# Patient Record
Sex: MALE | Race: OTHER | NOT HISPANIC OR LATINO | URBAN - METROPOLITAN AREA
[De-identification: names, ages, dates, MRNs, and addresses within clinical notes are randomized per-mention and may not be internally consistent; named-entity substitution may affect disease eponyms.]

---

## 2017-07-04 ENCOUNTER — EMERGENCY (EMERGENCY)
Facility: HOSPITAL | Age: 57
LOS: 0 days | Discharge: HOME | End: 2017-07-04

## 2017-07-04 DIAGNOSIS — M25.511 PAIN IN RIGHT SHOULDER: ICD-10-CM

## 2017-07-04 DIAGNOSIS — I10 ESSENTIAL (PRIMARY) HYPERTENSION: ICD-10-CM

## 2017-07-04 DIAGNOSIS — Z88.1 ALLERGY STATUS TO OTHER ANTIBIOTIC AGENTS STATUS: ICD-10-CM

## 2017-07-04 DIAGNOSIS — Z79.2 LONG TERM (CURRENT) USE OF ANTIBIOTICS: ICD-10-CM

## 2019-05-31 PROBLEM — Z00.00 ENCOUNTER FOR PREVENTIVE HEALTH EXAMINATION: Status: ACTIVE | Noted: 2019-05-31

## 2019-06-27 ENCOUNTER — RECORD ABSTRACTING (OUTPATIENT)
Age: 59
End: 2019-06-27

## 2019-06-27 DIAGNOSIS — Z82.49 FAMILY HISTORY OF ISCHEMIC HEART DISEASE AND OTHER DISEASES OF THE CIRCULATORY SYSTEM: ICD-10-CM

## 2019-06-27 DIAGNOSIS — Z92.89 PERSONAL HISTORY OF OTHER MEDICAL TREATMENT: ICD-10-CM

## 2019-06-27 RX ORDER — ASPIRIN 81 MG
81 TABLET, DELAYED RELEASE (ENTERIC COATED) ORAL DAILY
Refills: 0 | Status: ACTIVE | COMMUNITY

## 2019-06-27 RX ORDER — NITROGLYCERIN 0.4 MG/1
0.4 TABLET SUBLINGUAL
Refills: 0 | Status: ACTIVE | COMMUNITY

## 2019-08-26 VITALS — HEIGHT: 70 IN

## 2019-08-27 ENCOUNTER — TRANSCRIPTION ENCOUNTER (OUTPATIENT)
Age: 59
End: 2019-08-27

## 2019-08-27 ENCOUNTER — APPOINTMENT (OUTPATIENT)
Dept: CARDIOLOGY | Facility: CLINIC | Age: 59
End: 2019-08-27
Payer: MEDICAID

## 2019-08-27 VITALS
HEART RATE: 69 BPM | HEIGHT: 70 IN | SYSTOLIC BLOOD PRESSURE: 120 MMHG | BODY MASS INDEX: 30.21 KG/M2 | DIASTOLIC BLOOD PRESSURE: 72 MMHG | WEIGHT: 211 LBS

## 2019-08-27 DIAGNOSIS — E78.5 HYPERLIPIDEMIA, UNSPECIFIED: ICD-10-CM

## 2019-08-27 DIAGNOSIS — I25.2 OLD MYOCARDIAL INFARCTION: ICD-10-CM

## 2019-08-27 PROCEDURE — 93000 ELECTROCARDIOGRAM COMPLETE: CPT

## 2019-08-27 PROCEDURE — 99213 OFFICE O/P EST LOW 20 MIN: CPT

## 2019-08-27 RX ORDER — PNV NO.95/FERROUS FUM/FOLIC AC 28MG-0.8MG
TABLET ORAL
Refills: 0 | Status: ACTIVE | COMMUNITY

## 2019-08-27 NOTE — PHYSICAL EXAM
[General Appearance - Well Developed] : well developed [Normal Appearance] : normal appearance [Well Groomed] : well groomed [General Appearance - Well Nourished] : well nourished [No Deformities] : no deformities [Normal Oral Mucosa] : normal oral mucosa [General Appearance - In No Acute Distress] : no acute distress [No Oral Cyanosis] : no oral cyanosis [No Oral Pallor] : no oral pallor [Normal Jugular Venous A Waves Present] : normal jugular venous A waves present [No Jugular Venous Davis A Waves] : no jugular venous davis A waves [Normal Jugular Venous V Waves Present] : normal jugular venous V waves present [Respiration, Rhythm And Depth] : normal respiratory rhythm and effort [Auscultation Breath Sounds / Voice Sounds] : lungs were clear to auscultation bilaterally [Exaggerated Use Of Accessory Muscles For Inspiration] : no accessory muscle use [Heart Sounds] : normal S1 and S2 [Heart Rate And Rhythm] : heart rate and rhythm were normal [Murmurs] : no murmurs present [Abdomen Soft] : soft [Abdomen Tenderness] : non-tender [Abdomen Mass (___ Cm)] : no abdominal mass palpated [Nail Clubbing] : no clubbing of the fingernails [Petechial Hemorrhages (___cm)] : no petechial hemorrhages [Cyanosis, Localized] : no localized cyanosis [] : no ischemic changes [Skin Color & Pigmentation] : normal skin color and pigmentation [Oriented To Time, Place, And Person] : oriented to person, place, and time

## 2019-08-27 NOTE — HISTORY OF PRESENT ILLNESS
[FreeTextEntry1] : 57 yo male with pmhx as below is here for af/up visit\par S/p IPMI 1998, successfully trombolized, cath at [place] showed mod 2v cad, has been medically mx since then\par Last stress MPI 2/14 - low risk, stress echo 4/17 and 1/19 - no ischemia\par no major events, lost few lbs, started exercising again, no c/o cp, sob, nagel, pnd, orthopnea, no peripheral edema. No palpitations or exertional symptoms, no syncope or dizziness.\par et is stable, bp has been stable

## 2019-08-27 NOTE — ASSESSMENT
[FreeTextEntry1] : # Coronary arteriosclerosis in native artery  (I25.10):\par # Mixed hypercholesterolemia and hypertriglyceridemia  (E78.2):\par # Overweight  (E66.9):\par \par 59 yo male with pmhx and presentation as above\par cad\par  stable BP/dyslipidemia\par Arthritis\par \par Cont present care\par Repeat labs\par Cont with weight reduction, daily exercises and lowering caloric intake\par Avoid nsaids\par Cont with aggressive risk modif\par Diet and act as above\par RTC 4 months

## 2019-12-10 ENCOUNTER — APPOINTMENT (OUTPATIENT)
Dept: CARDIOLOGY | Facility: CLINIC | Age: 59
End: 2019-12-10
Payer: MEDICAID

## 2019-12-10 ENCOUNTER — RESULT CHARGE (OUTPATIENT)
Age: 59
End: 2019-12-10

## 2019-12-10 VITALS
BODY MASS INDEX: 29.63 KG/M2 | SYSTOLIC BLOOD PRESSURE: 118 MMHG | HEART RATE: 64 BPM | HEIGHT: 70 IN | WEIGHT: 207 LBS | DIASTOLIC BLOOD PRESSURE: 76 MMHG

## 2019-12-10 DIAGNOSIS — M19.90 UNSPECIFIED OSTEOARTHRITIS, UNSPECIFIED SITE: ICD-10-CM

## 2019-12-10 PROCEDURE — 93000 ELECTROCARDIOGRAM COMPLETE: CPT

## 2019-12-10 PROCEDURE — 99214 OFFICE O/P EST MOD 30 MIN: CPT

## 2019-12-10 RX ORDER — HYDROXYZINE HYDROCHLORIDE 25 MG/1
25 TABLET ORAL
Refills: 0 | Status: ACTIVE | COMMUNITY

## 2019-12-10 RX ORDER — DICLOFENAC SODIUM 50 MG/1
50 TABLET, DELAYED RELEASE ORAL
Refills: 0 | Status: ACTIVE | COMMUNITY

## 2019-12-10 NOTE — PHYSICAL EXAM
[General Appearance - Well Developed] : well developed [Normal Appearance] : normal appearance [Well Groomed] : well groomed [General Appearance - Well Nourished] : well nourished [No Deformities] : no deformities [General Appearance - In No Acute Distress] : no acute distress [Normal Oral Mucosa] : normal oral mucosa [No Oral Pallor] : no oral pallor [No Oral Cyanosis] : no oral cyanosis [Normal Jugular Venous V Waves Present] : normal jugular venous V waves present [Normal Jugular Venous A Waves Present] : normal jugular venous A waves present [No Jugular Venous Davis A Waves] : no jugular venous davis A waves [Respiration, Rhythm And Depth] : normal respiratory rhythm and effort [Exaggerated Use Of Accessory Muscles For Inspiration] : no accessory muscle use [Auscultation Breath Sounds / Voice Sounds] : lungs were clear to auscultation bilaterally [Heart Rate And Rhythm] : heart rate and rhythm were normal [Heart Sounds] : normal S1 and S2 [Murmurs] : no murmurs present [Abdomen Soft] : soft [Abdomen Tenderness] : non-tender [Abdomen Mass (___ Cm)] : no abdominal mass palpated [Nail Clubbing] : no clubbing of the fingernails [Cyanosis, Localized] : no localized cyanosis [Petechial Hemorrhages (___cm)] : no petechial hemorrhages [] : no ischemic changes [Skin Color & Pigmentation] : normal skin color and pigmentation [Oriented To Time, Place, And Person] : oriented to person, place, and time

## 2019-12-10 NOTE — HISTORY OF PRESENT ILLNESS
[FreeTextEntry1] : 57 yo male with pmhx as below is here for af/up visit\par S/p IPMI 1998, successfully trombolized, cath at [place] showed mod 2v cad, has been medically mx since then\par Last stress MPI 2/14 - low risk, stress echo 4/17 and 1/19 - no ischemia\par no major events, lost few lbs, started exercising again, no c/o cp, sob, nagel, pnd, orthopnea, no peripheral edema. No palpitations or exertional symptoms, no syncope or dizziness.\par et is stable, bp has been stable

## 2019-12-10 NOTE — PHYSICAL EXAM
[General Appearance - Well Developed] : well developed [Normal Appearance] : normal appearance [Well Groomed] : well groomed [General Appearance - Well Nourished] : well nourished [No Deformities] : no deformities [General Appearance - In No Acute Distress] : no acute distress [Normal Oral Mucosa] : normal oral mucosa [No Oral Pallor] : no oral pallor [No Oral Cyanosis] : no oral cyanosis [Normal Jugular Venous A Waves Present] : normal jugular venous A waves present [Normal Jugular Venous V Waves Present] : normal jugular venous V waves present [No Jugular Venous Davis A Waves] : no jugular venous davis A waves [Respiration, Rhythm And Depth] : normal respiratory rhythm and effort [Exaggerated Use Of Accessory Muscles For Inspiration] : no accessory muscle use [Auscultation Breath Sounds / Voice Sounds] : lungs were clear to auscultation bilaterally [Heart Rate And Rhythm] : heart rate and rhythm were normal [Heart Sounds] : normal S1 and S2 [Murmurs] : no murmurs present [Abdomen Soft] : soft [Abdomen Tenderness] : non-tender [Abdomen Mass (___ Cm)] : no abdominal mass palpated [Nail Clubbing] : no clubbing of the fingernails [Cyanosis, Localized] : no localized cyanosis [Petechial Hemorrhages (___cm)] : no petechial hemorrhages [] : no ischemic changes [Skin Color & Pigmentation] : normal skin color and pigmentation [Oriented To Time, Place, And Person] : oriented to person, place, and time

## 2019-12-10 NOTE — ASSESSMENT
[FreeTextEntry1] : # Coronary arteriosclerosis in native artery  (I25.10):\par # Mixed hypercholesterolemia and hypertriglyceridemia  (E78.2):\par # Overweight  (E66.9):\par \par 57 yo male with pmhx and presentation as above\par cad\par  stable BP/dyslipidemia\par Arthritis\par \par Cont present care\par Repeat labs\par Cont with weight reduction, daily exercises and lowering caloric intake\par Avoid nsaids\par Cont with aggressive risk modif\par Diet and act as above\par RTC 4 months

## 2019-12-10 NOTE — HISTORY OF PRESENT ILLNESS
[FreeTextEntry1] : 59 yo male with pmhx as below is here for af/up visit\par S/p IPMI 1998, successfully trombolized, cath at [place] showed mod 2v cad, has been medically mx since then\par Last stress MPI 2/14 - low risk, stress echo 4/17 and 1/19 - no ischemia\par no major events, lost few lbs, started exercising again, no c/o cp, sob, nagel, pnd, orthopnea, no peripheral edema. No palpitations or exertional symptoms, no syncope or dizziness.\par et is stable, bp has been stable

## 2020-01-28 ENCOUNTER — APPOINTMENT (OUTPATIENT)
Dept: CARDIOLOGY | Facility: CLINIC | Age: 60
End: 2020-01-28
Payer: MEDICAID

## 2020-01-28 VITALS
BODY MASS INDEX: 30.28 KG/M2 | DIASTOLIC BLOOD PRESSURE: 78 MMHG | SYSTOLIC BLOOD PRESSURE: 118 MMHG | HEART RATE: 63 BPM | WEIGHT: 211 LBS

## 2020-01-28 PROCEDURE — 99214 OFFICE O/P EST MOD 30 MIN: CPT

## 2020-01-28 PROCEDURE — 93000 ELECTROCARDIOGRAM COMPLETE: CPT

## 2020-01-28 NOTE — HISTORY OF PRESENT ILLNESS
[FreeTextEntry1] : 60 yo male with pmhx as below is here for af/up visit\par S/p IPMI 1998, successfully trombolized, cath at [place] showed mod 2v cad, has been medically mx since then\par Last stress MPI 2/14 - low risk, stress echo 4/17 and 1/19 - no ischemia\par + cp and epigastric discomfort last few days, + Stress\par gained few lbs, stopped exercise due to arthritis, no c/o sob, nagel, pnd, orthopnea, no peripheral edema. No palpitations or exertional symptoms, no syncope or dizziness.\par et is stable, bp has been stable

## 2020-01-28 NOTE — PHYSICAL EXAM
[General Appearance - Well Developed] : well developed [Normal Appearance] : normal appearance [Well Groomed] : well groomed [General Appearance - Well Nourished] : well nourished [No Deformities] : no deformities [Normal Oral Mucosa] : normal oral mucosa [General Appearance - In No Acute Distress] : no acute distress [No Oral Pallor] : no oral pallor [No Oral Cyanosis] : no oral cyanosis [Normal Jugular Venous A Waves Present] : normal jugular venous A waves present [No Jugular Venous Davis A Waves] : no jugular venous davis A waves [Normal Jugular Venous V Waves Present] : normal jugular venous V waves present [Heart Rate And Rhythm] : heart rate and rhythm were normal [Heart Sounds] : normal S1 and S2 [Murmurs] : no murmurs present [Respiration, Rhythm And Depth] : normal respiratory rhythm and effort [Auscultation Breath Sounds / Voice Sounds] : lungs were clear to auscultation bilaterally [Exaggerated Use Of Accessory Muscles For Inspiration] : no accessory muscle use [Abdomen Soft] : soft [Abdomen Tenderness] : non-tender [Abdomen Mass (___ Cm)] : no abdominal mass palpated [Nail Clubbing] : no clubbing of the fingernails [] : no ischemic changes [Cyanosis, Localized] : no localized cyanosis [Petechial Hemorrhages (___cm)] : no petechial hemorrhages [Oriented To Time, Place, And Person] : oriented to person, place, and time [Skin Color & Pigmentation] : normal skin color and pigmentation

## 2020-01-28 NOTE — ASSESSMENT
[FreeTextEntry1] : # Coronary arteriosclerosis in native artery  (I25.10):\par # Mixed hypercholesterolemia and hypertriglyceridemia  (E78.2):\par # Overweight  (E66.9):\par \par 58 yo male with pmhx and presentation as above\par cad/cp\par stable BP/dyslipidemia\par Arthritis\par \par Cont present care\par monitor symptoms, cp rather atypical\par ?GI eval\par Cont with weight reduction, daily exercises and lowering caloric intake\par Avoid nsaids on regular bases - may take prn\par Cont with aggressive risk modif\par Diet and act as above\par RTC 6-8 weeks

## 2020-07-21 ENCOUNTER — RESULT CHARGE (OUTPATIENT)
Age: 60
End: 2020-07-21

## 2020-07-21 ENCOUNTER — APPOINTMENT (OUTPATIENT)
Dept: CARDIOLOGY | Facility: CLINIC | Age: 60
End: 2020-07-21
Payer: MEDICAID

## 2020-07-21 VITALS — TEMPERATURE: 97.4 F | WEIGHT: 208 LBS | BODY MASS INDEX: 29.85 KG/M2

## 2020-07-21 VITALS — SYSTOLIC BLOOD PRESSURE: 104 MMHG | HEART RATE: 57 BPM | DIASTOLIC BLOOD PRESSURE: 60 MMHG

## 2020-07-21 PROCEDURE — 99214 OFFICE O/P EST MOD 30 MIN: CPT

## 2020-07-21 PROCEDURE — 93000 ELECTROCARDIOGRAM COMPLETE: CPT

## 2020-07-21 NOTE — PHYSICAL EXAM
[General Appearance - Well Developed] : well developed [Normal Appearance] : normal appearance [Well Groomed] : well groomed [General Appearance - Well Nourished] : well nourished [No Deformities] : no deformities [General Appearance - In No Acute Distress] : no acute distress [Normal Oral Mucosa] : normal oral mucosa [No Oral Pallor] : no oral pallor [No Oral Cyanosis] : no oral cyanosis [Normal Jugular Venous A Waves Present] : normal jugular venous A waves present [No Jugular Venous Davis A Waves] : no jugular venous davis A waves [Normal Jugular Venous V Waves Present] : normal jugular venous V waves present [Exaggerated Use Of Accessory Muscles For Inspiration] : no accessory muscle use [Respiration, Rhythm And Depth] : normal respiratory rhythm and effort [Auscultation Breath Sounds / Voice Sounds] : lungs were clear to auscultation bilaterally [Heart Rate And Rhythm] : heart rate and rhythm were normal [Murmurs] : no murmurs present [Heart Sounds] : normal S1 and S2 [Abdomen Soft] : soft [Abdomen Tenderness] : non-tender [Abdomen Mass (___ Cm)] : no abdominal mass palpated [Nail Clubbing] : no clubbing of the fingernails [Cyanosis, Localized] : no localized cyanosis [Petechial Hemorrhages (___cm)] : no petechial hemorrhages [] : no ischemic changes [Oriented To Time, Place, And Person] : oriented to person, place, and time [Skin Color & Pigmentation] : normal skin color and pigmentation

## 2020-07-21 NOTE — HISTORY OF PRESENT ILLNESS
[FreeTextEntry1] : 58 yo male with pmhx as below is here for af/up visit\par S/p IPMI 1998, successfully trombolized, cath at [place] showed mod 2v cad, has been medically mx since then\par Last stress MPI 2/14 - low risk, stress echo 4/17 and 1/19 - no ischemia\par LOST Few lbs, started exercising 3-4 times weekly,  no c/o sob, nagel, pnd, orthopnea, no peripheral edema. No palpitations or exertional symptoms, no syncope or dizziness.\par et is stable, bp has been labile

## 2020-07-21 NOTE — ASSESSMENT
[FreeTextEntry1] : # Coronary arteriosclerosis in native artery  (I25.10):\par # Mixed hypercholesterolemia and hypertriglyceridemia  (E78.2):\par # Overweight  (E66.9):\par \par 58 yo male with pmhx and presentation as above\par cad/cp\par stable BP/dyslipidemia\par Arthritis\par \par Cont present care\par cut losartan in half\par Cont with weight reduction, daily exercises and lowering caloric intake\par Avoid nsaids on regular bases - may take prn\par Cont with aggressive risk modif\par Diet and act as above\par RTC 4 months, labs 1 week prior

## 2020-10-19 RX ORDER — CHOLECALCIFEROL (VITAMIN D3) 1250 MCG
1.25 MG CAPSULE ORAL
Qty: 12 | Refills: 3 | Status: ACTIVE | COMMUNITY
Start: 1900-01-01 | End: 1900-01-01

## 2020-11-24 ENCOUNTER — APPOINTMENT (OUTPATIENT)
Dept: CARDIOLOGY | Facility: CLINIC | Age: 60
End: 2020-11-24
Payer: MEDICAID

## 2020-11-24 VITALS
HEIGHT: 70 IN | HEART RATE: 58 BPM | DIASTOLIC BLOOD PRESSURE: 80 MMHG | TEMPERATURE: 97.3 F | SYSTOLIC BLOOD PRESSURE: 140 MMHG | WEIGHT: 210 LBS | BODY MASS INDEX: 30.06 KG/M2 | RESPIRATION RATE: 16 BRPM | OXYGEN SATURATION: 100 %

## 2020-11-24 PROCEDURE — 99214 OFFICE O/P EST MOD 30 MIN: CPT

## 2020-11-24 PROCEDURE — 93000 ELECTROCARDIOGRAM COMPLETE: CPT

## 2020-11-24 NOTE — ASSESSMENT
[FreeTextEntry1] : # Coronary arteriosclerosis in native artery  (I25.10):\par # Mixed hypercholesterolemia and hypertriglyceridemia  (E78.2):\par # Overweight  (E66.9):\par \par 61 yo male with pmhx and presentation as above\par cad/cp\par stable BP/dyslipidemia\par Arthritis\par \par Cont present care\par take arb full dose\par labs reviewed, ldl noted, wants to try diet and exercise\par stress echo next visit\par Cont with weight reduction, daily exercises and lowering caloric intake\par Avoid nsaids on regular bases - may take prn\par Cont with aggressive risk modif\par Diet and act as above\par RTC 4 months

## 2020-11-24 NOTE — HISTORY OF PRESENT ILLNESS
[FreeTextEntry1] : 61 yo male with pmhx as below is here for af/up visit\par S/p IPMI 1998, successfully trombolized, cath at [place] showed mod 2v cad, has been medically mx since then\par Last stress MPI 2/14 - low risk, stress echo 4/17 and 1/19 - no ischemia\par Started exercising 3-4 times weekly,  no c/o sob, nagel, pnd, orthopnea, no peripheral edema. No palpitations or exertional symptoms, no syncope or dizziness.\par et is stable, bp has been labile

## 2021-04-13 ENCOUNTER — APPOINTMENT (OUTPATIENT)
Dept: CARDIOLOGY | Facility: CLINIC | Age: 61
End: 2021-04-13

## 2021-06-04 ENCOUNTER — TRANSCRIPTION ENCOUNTER (OUTPATIENT)
Age: 61
End: 2021-06-04

## 2021-09-09 ENCOUNTER — RX RENEWAL (OUTPATIENT)
Age: 61
End: 2021-09-09

## 2021-09-14 ENCOUNTER — NON-APPOINTMENT (OUTPATIENT)
Age: 61
End: 2021-09-14

## 2021-09-14 ENCOUNTER — APPOINTMENT (OUTPATIENT)
Dept: CARDIOLOGY | Facility: CLINIC | Age: 61
End: 2021-09-14
Payer: MEDICAID

## 2021-09-14 VITALS
HEIGHT: 70 IN | SYSTOLIC BLOOD PRESSURE: 130 MMHG | HEART RATE: 55 BPM | WEIGHT: 212 LBS | BODY MASS INDEX: 30.35 KG/M2 | DIASTOLIC BLOOD PRESSURE: 80 MMHG | RESPIRATION RATE: 16 BRPM

## 2021-09-14 PROCEDURE — 99214 OFFICE O/P EST MOD 30 MIN: CPT | Mod: 25

## 2021-09-14 PROCEDURE — 93325 DOPPLER ECHO COLOR FLOW MAPG: CPT

## 2021-09-14 PROCEDURE — 93351 STRESS TTE COMPLETE: CPT

## 2021-09-14 PROCEDURE — 93320 DOPPLER ECHO COMPLETE: CPT

## 2021-09-14 NOTE — PHYSICAL EXAM
[No Acute Distress] : no acute distress [Normal Venous Pressure] : normal venous pressure [No Carotid Bruit] : no carotid bruit [Normal S1, S2] : normal S1, S2 [No Murmur] : no murmur [No Rub] : no rub [No Gallop] : no gallop [Clear Lung Fields] : clear lung fields [Good Air Entry] : good air entry [No Respiratory Distress] : no respiratory distress  [Soft] : abdomen soft [Non Tender] : non-tender [No Masses/organomegaly] : no masses/organomegaly [Normal Bowel Sounds] : normal bowel sounds [Normal Gait] : normal gait [No Edema] : no edema [No Cyanosis] : no cyanosis [No Clubbing] : no clubbing [No Varicosities] : no varicosities [No Rash] : no rash [No Skin Lesions] : no skin lesions [Moves all extremities] : moves all extremities [No Focal Deficits] : no focal deficits [Normal Speech] : normal speech [Alert and Oriented] : alert and oriented [Normal memory] : normal memory

## 2021-09-14 NOTE — REASON FOR VISIT
[Symptom and Test Evaluation] : symptom and test evaluation [CV Risk Factors and Non-Cardiac Disease] : CV risk factors and non-cardiac disease [Hyperlipidemia] : hyperlipidemia [Hypertension] : hypertension [Coronary Artery Disease] : coronary artery disease

## 2021-09-14 NOTE — ASSESSMENT
[FreeTextEntry1] : # Coronary arteriosclerosis in native artery  (I25.10):\par # Mixed hypercholesterolemia and hypertriglyceridemia  (E78.2):\par # Overweight  (E66.9):\par \par 59 yo male with pmhx and presentation as above\par cad/cp\par stable BP/dyslipidemia\par Arthritis\par \par Cont present care\par increase arb to 50\par labs reviewed, ldl noted, agree with lipitor 20\par stress echo - htn response with distal inferoseptal hypo - ccta\par Cont with weight reduction, daily exercises and lowering caloric intake\par Avoid nsaids on regular bases - may take prn\par Cont with aggressive risk modif\par Diet and act as above\par RTC after ccta

## 2021-09-14 NOTE — HISTORY OF PRESENT ILLNESS
[FreeTextEntry1] : 59 yo male with pmhx as below is here for af/up visit\par S/p IPMI 1998, successfully trombolized, cath at [place] showed mod 2v cad, has been medically mx since then\par Last stress MPI 2/14 - low risk, stress echo 4/17 and 1/19 - no ischemia\par Started exercising 3-4 times weekly,  no c/o sob, nagel, pnd, orthopnea, no peripheral edema. No palpitations or exertional symptoms, no syncope or dizziness.\par et is stable, bp has been labile

## 2021-09-21 ENCOUNTER — TRANSCRIPTION ENCOUNTER (OUTPATIENT)
Age: 61
End: 2021-09-21

## 2021-10-31 ENCOUNTER — TRANSCRIPTION ENCOUNTER (OUTPATIENT)
Age: 61
End: 2021-10-31

## 2021-11-23 ENCOUNTER — TRANSCRIPTION ENCOUNTER (OUTPATIENT)
Age: 61
End: 2021-11-23

## 2021-12-28 ENCOUNTER — TRANSCRIPTION ENCOUNTER (OUTPATIENT)
Age: 61
End: 2021-12-28

## 2022-01-11 ENCOUNTER — APPOINTMENT (OUTPATIENT)
Dept: CARDIOLOGY | Facility: CLINIC | Age: 62
End: 2022-01-11
Payer: MEDICAID

## 2022-01-11 VITALS
TEMPERATURE: 97.6 F | RESPIRATION RATE: 16 BRPM | SYSTOLIC BLOOD PRESSURE: 145 MMHG | HEART RATE: 66 BPM | WEIGHT: 212 LBS | OXYGEN SATURATION: 98 % | HEIGHT: 70 IN | BODY MASS INDEX: 30.35 KG/M2 | DIASTOLIC BLOOD PRESSURE: 80 MMHG

## 2022-01-11 PROCEDURE — 93000 ELECTROCARDIOGRAM COMPLETE: CPT

## 2022-01-11 PROCEDURE — 99214 OFFICE O/P EST MOD 30 MIN: CPT

## 2022-01-11 NOTE — ASSESSMENT
[FreeTextEntry1] : # Coronary arteriosclerosis in native artery  (I25.10):\par # Mixed hypercholesterolemia and hypertriglyceridemia  (E78.2):\par # Overweight  (E66.9):\par \par 60 yo male with pmhx and presentation as above\par cad/cp\par stable BP/dyslipidemia\par Arthritis\par \par Cont present care\par CCTa reviewed, findings d/w the pt at length\par labs reviewed, ldl noted, agree with lipitor 20 and will add zetia as pt can not tolerate higher dose of statins\par Cont with weight reduction, daily exercises and lowering caloric intake\par Avoid nsaids on regular bases - may take prn\par Cont with aggressive risk modif\par Diet and act as above\par RTC 3-4 months

## 2022-01-11 NOTE — HISTORY OF PRESENT ILLNESS
[FreeTextEntry1] : 60 yo male with pmhx as below is here for af/up visit\par S/p IPMI 1998, successfully trombolized, cath at [place] showed mod 2v cad, has been medically mx since then\par Last stress MPI 2/14 - low risk, stress echo 4/17 and 1/19 - no ischemia\par s/p ccta - extensive mod cad with possible severe lesion on rca\par Still  exercising 3-4 times weekly,  no c/o sob, nagel, pnd, orthopnea, no peripheral edema. No palpitations or exertional symptoms, no syncope or dizziness.\par et is stable, bp has been labile

## 2022-01-18 ENCOUNTER — TRANSCRIPTION ENCOUNTER (OUTPATIENT)
Age: 62
End: 2022-01-18

## 2022-02-15 ENCOUNTER — TRANSCRIPTION ENCOUNTER (OUTPATIENT)
Age: 62
End: 2022-02-15

## 2022-02-28 ENCOUNTER — TRANSCRIPTION ENCOUNTER (OUTPATIENT)
Age: 62
End: 2022-02-28

## 2022-04-19 ENCOUNTER — APPOINTMENT (OUTPATIENT)
Dept: CARDIOLOGY | Facility: CLINIC | Age: 62
End: 2022-04-19
Payer: MEDICAID

## 2022-04-19 VITALS
RESPIRATION RATE: 16 BRPM | WEIGHT: 217 LBS | HEIGHT: 70 IN | BODY MASS INDEX: 31.07 KG/M2 | TEMPERATURE: 97 F | HEART RATE: 68 BPM | DIASTOLIC BLOOD PRESSURE: 78 MMHG | OXYGEN SATURATION: 99 % | SYSTOLIC BLOOD PRESSURE: 122 MMHG

## 2022-04-19 PROCEDURE — 93000 ELECTROCARDIOGRAM COMPLETE: CPT

## 2022-04-19 PROCEDURE — 99214 OFFICE O/P EST MOD 30 MIN: CPT

## 2022-04-19 NOTE — HISTORY OF PRESENT ILLNESS
[FreeTextEntry1] : 60 yo male with pmhx as below is here for af/up visit\par S/p IPMI 1998, successfully trombolized, cath at [place] showed mod 2v cad, has been medically mx since then\par Last stress MPI 2/14 - low risk, stress echo 4/17 and 1/19 - no ischemia\par s/p ccta - extensive mod cad with possible severe lesion on rca\par Stopped  exercising 3-4 times weekly,  no c/o sob, nagel, pnd, orthopnea, no peripheral edema. No palpitations or exertional symptoms, no syncope or dizziness.\par et is stable, bp has been labile\par + weight gain

## 2022-04-19 NOTE — ASSESSMENT
[FreeTextEntry1] : # Coronary arteriosclerosis in native artery  (I25.10):\par # Mixed hypercholesterolemia and hypertriglyceridemia  (E78.2):\par # Overweight  (E66.9):\par \par 62 yo male with pmhx and presentation as above\par cad/cp\par stable BP/dyslipidemia\par Arthritis\par \par Cont present care\par labs reviewed, ldl noted, cont with lipitor 20 and and zetia as pt can not tolerate higher dose of statins\par Cont with weight reduction, daily exercises and lowering caloric intake\par Avoid nsaids on regular bases - may take prn\par Cont with aggressive risk modif\par Diet and act as above\par weight reduction\par RTC 3-4 months

## 2022-04-27 ENCOUNTER — RX RENEWAL (OUTPATIENT)
Age: 62
End: 2022-04-27

## 2022-08-16 ENCOUNTER — APPOINTMENT (OUTPATIENT)
Dept: CARDIOLOGY | Facility: CLINIC | Age: 62
End: 2022-08-16

## 2022-08-16 VITALS
HEART RATE: 58 BPM | BODY MASS INDEX: 31.5 KG/M2 | OXYGEN SATURATION: 97 % | HEIGHT: 70 IN | TEMPERATURE: 97 F | SYSTOLIC BLOOD PRESSURE: 125 MMHG | RESPIRATION RATE: 16 BRPM | DIASTOLIC BLOOD PRESSURE: 75 MMHG | WEIGHT: 220 LBS

## 2022-08-16 PROCEDURE — 99213 OFFICE O/P EST LOW 20 MIN: CPT | Mod: 25

## 2022-08-16 PROCEDURE — 93000 ELECTROCARDIOGRAM COMPLETE: CPT

## 2022-08-16 RX ORDER — AZELASTINE HYDROCHLORIDE 0.5 MG/ML
0.05 SOLUTION/ DROPS OPHTHALMIC
Qty: 6 | Refills: 0 | Status: ACTIVE | COMMUNITY
Start: 2021-07-08

## 2022-08-16 RX ORDER — AZELASTINE HYDROCHLORIDE 137 UG/1
0.1 SPRAY, METERED NASAL
Qty: 30 | Refills: 0 | Status: ACTIVE | COMMUNITY
Start: 2022-03-29

## 2022-08-16 RX ORDER — PANTOPRAZOLE 40 MG/1
40 TABLET, DELAYED RELEASE ORAL
Qty: 30 | Refills: 0 | Status: ACTIVE | COMMUNITY
Start: 2022-07-28

## 2022-08-16 RX ORDER — LATANOPROST/PF 0.005 %
0.01 DROPS OPHTHALMIC (EYE)
Qty: 2 | Refills: 0 | Status: ACTIVE | COMMUNITY
Start: 2022-07-28

## 2022-08-16 NOTE — HISTORY OF PRESENT ILLNESS
[FreeTextEntry1] : 60 yo male with pmhx as below is here for af/up visit\par S/p IPMI 1998, successfully trombolized, cath at [place] showed mod 2v cad, has been medically mx since then\par Last stress MPI 2/14 - low risk, stress echo 4/17 and 1/19 - no ischemia\par s/p ccta - extensive mod cad with possible severe lesion on rca\par Stopped  exercising 3-4 times weekly,  no c/o sob, nagel, pnd, orthopnea, no peripheral edema. No palpitations or exertional symptoms, no syncope or dizziness.\par et is stable, bp has been stable

## 2022-08-16 NOTE — ASSESSMENT
[FreeTextEntry1] : # Coronary arteriosclerosis in native artery  (I25.10):\par # Mixed hypercholesterolemia and hypertriglyceridemia  (E78.2):\par # Overweight  (E66.9):\par \par 60 yo male with pmhx and presentation as above\par cad/cp\par stable BP/dyslipidemia\par Arthritis\par \par Cont present care\par repeat labs\par Cont with weight reduction, daily exercises and lowering caloric intake\par Avoid nsaids on regular bases - may take prn\par Cont with aggressive risk modif\par Diet and act as above\par weight reduction\par RTC 3-4 months

## 2022-09-23 ENCOUNTER — RX RENEWAL (OUTPATIENT)
Age: 62
End: 2022-09-23

## 2022-09-23 RX ORDER — ERGOCALCIFEROL 1.25 MG/1
1.25 MG CAPSULE, LIQUID FILLED ORAL
Qty: 12 | Refills: 3 | Status: ACTIVE | COMMUNITY
Start: 2021-09-09 | End: 1900-01-01

## 2022-12-20 ENCOUNTER — NON-APPOINTMENT (OUTPATIENT)
Age: 62
End: 2022-12-20

## 2022-12-20 ENCOUNTER — APPOINTMENT (OUTPATIENT)
Dept: CARDIOLOGY | Facility: CLINIC | Age: 62
End: 2022-12-20

## 2022-12-20 VITALS
HEIGHT: 70 IN | BODY MASS INDEX: 33.64 KG/M2 | TEMPERATURE: 97.4 F | WEIGHT: 235 LBS | HEART RATE: 62 BPM | SYSTOLIC BLOOD PRESSURE: 140 MMHG | DIASTOLIC BLOOD PRESSURE: 70 MMHG

## 2022-12-20 PROCEDURE — 99214 OFFICE O/P EST MOD 30 MIN: CPT | Mod: 25

## 2022-12-20 PROCEDURE — 93000 ELECTROCARDIOGRAM COMPLETE: CPT

## 2022-12-20 NOTE — HISTORY OF PRESENT ILLNESS
[FreeTextEntry1] : 61 yo male with pmhx as below is here for af/up visit\par S/p IPMI 1998, successfully trombolized, cath at [place] showed mod 2v cad, has been medically mx since then\par Last stress MPI 2/14 - low risk, stress echo 4/17 and 1/19 - no ischemia\par s/p ccta - extensive mod cad with possible severe lesion on rca\par Stopped  exercising, gained > 15 lbs\par  no c/o sob, nagel, pnd, orthopnea, no peripheral edema. No palpitations or exertional symptoms, no syncope or dizziness.\par et is stable, bp has been stable as well

## 2022-12-20 NOTE — ASSESSMENT
[FreeTextEntry1] : # Coronary arteriosclerosis in native artery  (I25.10):\par # Mixed hypercholesterolemia and hypertriglyceridemia  (E78.2):\par # Overweight  (E66.9):\par \par 63 yo male with pmhx and presentation as above\par cad/cp\par stable BP/dyslipidemia\par Arthritis/weight gain\par \par Cont present care\par repeat labs reviewed, lipids at goal\par Weight reduction strongly encouraged, daily exercises and low caloric intake discussed\par Avoid nsaids on regular bases - may take prn\par Cont with aggressive risk modif\par Diet and act as above\par Increase CoQ 10, ample hydration\par RTC 3-4 months

## 2022-12-24 ENCOUNTER — RX RENEWAL (OUTPATIENT)
Age: 62
End: 2022-12-24

## 2023-03-21 ENCOUNTER — APPOINTMENT (OUTPATIENT)
Dept: CARDIOLOGY | Facility: CLINIC | Age: 63
End: 2023-03-21
Payer: MEDICAID

## 2023-03-21 VITALS
WEIGHT: 231 LBS | OXYGEN SATURATION: 99 % | HEIGHT: 70 IN | RESPIRATION RATE: 16 BRPM | DIASTOLIC BLOOD PRESSURE: 70 MMHG | SYSTOLIC BLOOD PRESSURE: 118 MMHG | TEMPERATURE: 97 F | HEART RATE: 62 BPM | BODY MASS INDEX: 33.07 KG/M2

## 2023-03-21 DIAGNOSIS — R94.39 ABNORMAL RESULT OF OTHER CARDIOVASCULAR FUNCTION STUDY: ICD-10-CM

## 2023-03-21 PROCEDURE — 99214 OFFICE O/P EST MOD 30 MIN: CPT | Mod: 25

## 2023-03-21 PROCEDURE — 93000 ELECTROCARDIOGRAM COMPLETE: CPT

## 2023-03-21 NOTE — ASSESSMENT
[FreeTextEntry1] : # Coronary arteriosclerosis in native artery  (I25.10):\par # Mixed hypercholesterolemia and hypertriglyceridemia  (E78.2):\par # Overweight  (E66.9):\par \par 61 yo male with pmhx and presentation as above\par cad/cp\par stable BP/dyslipidemia\par Arthritis/obesity\par \par Cont present care\par if recurrent or worsening symptoms - cath, otherwise med rx for cad\par repeat labs reviewed, lipids at goal\par Weight reduction strongly encouraged, daily exercises and low caloric intake discussed\par Avoid nsaids on regular bases - may take prn\par Cont with aggressive risk modif\par Diet and act as above\par Increase CoQ 10, ample hydration\par RTC 3-4 months

## 2023-03-21 NOTE — HISTORY OF PRESENT ILLNESS
[FreeTextEntry1] : 63 yo male with pmhx as below is here for af/up visit\par S/p IPMI 1998, successfully trombolized, cath at [place] showed mod 2v cad, has been medically mx since then\par Last stress MPI 2/14 - low risk, stress echo 4/17 and 1/19 - no ischemia\par s/p ccta - extensive mod cad with possible severe lesion on rca\par Stopped  exercising due to arthritis\par lost few lbs\par no c/o sob, nagel, pnd, orthopnea, no peripheral edema. No palpitations or exertional symptoms, no syncope or dizziness.\par et is stable, bp has been stable as well

## 2023-04-19 ENCOUNTER — RX RENEWAL (OUTPATIENT)
Age: 63
End: 2023-04-19

## 2023-04-19 ENCOUNTER — APPOINTMENT (OUTPATIENT)
Dept: ORTHOPEDIC SURGERY | Facility: CLINIC | Age: 63
End: 2023-04-19
Payer: MEDICAID

## 2023-04-19 PROCEDURE — 73562 X-RAY EXAM OF KNEE 3: CPT | Mod: 50

## 2023-04-19 PROCEDURE — 73650 X-RAY EXAM OF HEEL: CPT | Mod: LT

## 2023-04-19 PROCEDURE — 99213 OFFICE O/P EST LOW 20 MIN: CPT

## 2023-04-19 NOTE — HISTORY OF PRESENT ILLNESS
[de-identified] :  62 still active has put on weight pain at rest 7 activity 8 does limp heart attack at 36 on medications for his heart still not allowed to take NSAID does not smoke no drug allergies\par History of Present Illness\par after last visit was doing well and was stable with the therapy but then it stopped as resulting started having some\par increased pain in his left hip and restarted the diclofenac but then after 6 days had shortness of breath more recently\par in April he developed some pain in the right shoulder this shoulder had had a cortisone injection July 5, 2017 and felt\par much better he was told he had some calcific deposit the time no MRI was done weight stable he is tries to do his\par walking. No other trauma last visit cortisone injection helped as did the resumption of PT 11 visits now walking again

## 2023-04-19 NOTE — ASSESSMENT
[FreeTextEntry1] :  cardiology will not let him take NSAID needs to work on weight reduction will go for some therapy heat stretching no injections today or MRI I will see him in a couple months he will try tumeric    if his cardiologist will allow it he could try Voltaren gel

## 2023-04-19 NOTE — IMAGING
[de-identified] :  pleasant he is examined in no distress neck and back some spasm both shoulders good motion no impingement\par \par Both hips good motion diffuse lower back tenderness tight dorsal lumbar fascia and hamstrings negative straight leg bilaterally\par \par Both knees mild swelling and crepitus medially calf soft negative Homans sign \par \par Left heel tenderness around the Achilles insertion\par \par X-ray left calcaneus unremarkable small spur\par \par X-rays both knees mild degenerative change

## 2023-06-15 ENCOUNTER — RX RENEWAL (OUTPATIENT)
Age: 63
End: 2023-06-15

## 2023-07-11 ENCOUNTER — INPATIENT (INPATIENT)
Facility: HOSPITAL | Age: 63
LOS: 1 days | Discharge: HOME | DRG: 174 | End: 2023-07-13
Attending: HOSPITALIST | Admitting: INTERNAL MEDICINE
Payer: MEDICAID

## 2023-07-11 ENCOUNTER — APPOINTMENT (OUTPATIENT)
Dept: ORTHOPEDIC SURGERY | Facility: CLINIC | Age: 63
End: 2023-07-11

## 2023-07-11 VITALS
HEART RATE: 98 BPM | DIASTOLIC BLOOD PRESSURE: 96 MMHG | WEIGHT: 225.09 LBS | RESPIRATION RATE: 18 BRPM | TEMPERATURE: 98 F | SYSTOLIC BLOOD PRESSURE: 169 MMHG | OXYGEN SATURATION: 100 %

## 2023-07-11 DIAGNOSIS — E78.5 HYPERLIPIDEMIA, UNSPECIFIED: ICD-10-CM

## 2023-07-11 DIAGNOSIS — I25.110 ATHEROSCLEROTIC HEART DISEASE OF NATIVE CORONARY ARTERY WITH UNSTABLE ANGINA PECTORIS: ICD-10-CM

## 2023-07-11 DIAGNOSIS — I21.4 NON-ST ELEVATION (NSTEMI) MYOCARDIAL INFARCTION: ICD-10-CM

## 2023-07-11 DIAGNOSIS — I10 ESSENTIAL (PRIMARY) HYPERTENSION: ICD-10-CM

## 2023-07-11 DIAGNOSIS — I25.2 OLD MYOCARDIAL INFARCTION: ICD-10-CM

## 2023-07-11 DIAGNOSIS — Z79.899 OTHER LONG TERM (CURRENT) DRUG THERAPY: ICD-10-CM

## 2023-07-11 DIAGNOSIS — Z79.82 LONG TERM (CURRENT) USE OF ASPIRIN: ICD-10-CM

## 2023-07-11 DIAGNOSIS — I25.84 CORONARY ATHEROSCLEROSIS DUE TO CALCIFIED CORONARY LESION: ICD-10-CM

## 2023-07-11 DIAGNOSIS — R00.1 BRADYCARDIA, UNSPECIFIED: ICD-10-CM

## 2023-07-11 LAB
ALBUMIN SERPL ELPH-MCNC: 4.7 G/DL — SIGNIFICANT CHANGE UP (ref 3.5–5.2)
ALP SERPL-CCNC: 96 U/L — SIGNIFICANT CHANGE UP (ref 30–115)
ALT FLD-CCNC: 46 U/L — HIGH (ref 0–41)
ANION GAP SERPL CALC-SCNC: 11 MMOL/L — SIGNIFICANT CHANGE UP (ref 7–14)
AST SERPL-CCNC: 35 U/L — SIGNIFICANT CHANGE UP (ref 0–41)
BASOPHILS # BLD AUTO: 0.04 K/UL — SIGNIFICANT CHANGE UP (ref 0–0.2)
BASOPHILS NFR BLD AUTO: 0.4 % — SIGNIFICANT CHANGE UP (ref 0–1)
BILIRUB SERPL-MCNC: 0.7 MG/DL — SIGNIFICANT CHANGE UP (ref 0.2–1.2)
BUN SERPL-MCNC: 17 MG/DL — SIGNIFICANT CHANGE UP (ref 10–20)
CALCIUM SERPL-MCNC: 9.1 MG/DL — SIGNIFICANT CHANGE UP (ref 8.4–10.5)
CHLORIDE SERPL-SCNC: 107 MMOL/L — SIGNIFICANT CHANGE UP (ref 98–110)
CO2 SERPL-SCNC: 23 MMOL/L — SIGNIFICANT CHANGE UP (ref 17–32)
CREAT SERPL-MCNC: 1.1 MG/DL — SIGNIFICANT CHANGE UP (ref 0.7–1.5)
EGFR: 76 ML/MIN/1.73M2 — SIGNIFICANT CHANGE UP
EOSINOPHIL # BLD AUTO: 0.35 K/UL — SIGNIFICANT CHANGE UP (ref 0–0.7)
EOSINOPHIL NFR BLD AUTO: 3.5 % — SIGNIFICANT CHANGE UP (ref 0–8)
GAS PNL BLDV: SIGNIFICANT CHANGE UP
GLUCOSE SERPL-MCNC: 114 MG/DL — HIGH (ref 70–99)
HCT VFR BLD CALC: 49.1 % — SIGNIFICANT CHANGE UP (ref 42–52)
HGB BLD-MCNC: 16.7 G/DL — SIGNIFICANT CHANGE UP (ref 14–18)
IMM GRANULOCYTES NFR BLD AUTO: 0.4 % — HIGH (ref 0.1–0.3)
LYMPHOCYTES # BLD AUTO: 2.16 K/UL — SIGNIFICANT CHANGE UP (ref 1.2–3.4)
LYMPHOCYTES # BLD AUTO: 21.7 % — SIGNIFICANT CHANGE UP (ref 20.5–51.1)
MCHC RBC-ENTMCNC: 30.1 PG — SIGNIFICANT CHANGE UP (ref 27–31)
MCHC RBC-ENTMCNC: 34 G/DL — SIGNIFICANT CHANGE UP (ref 32–37)
MCV RBC AUTO: 88.5 FL — SIGNIFICANT CHANGE UP (ref 80–94)
MONOCYTES # BLD AUTO: 0.6 K/UL — SIGNIFICANT CHANGE UP (ref 0.1–0.6)
MONOCYTES NFR BLD AUTO: 6 % — SIGNIFICANT CHANGE UP (ref 1.7–9.3)
NEUTROPHILS # BLD AUTO: 6.76 K/UL — HIGH (ref 1.4–6.5)
NEUTROPHILS NFR BLD AUTO: 68 % — SIGNIFICANT CHANGE UP (ref 42.2–75.2)
NRBC # BLD: 0 /100 WBCS — SIGNIFICANT CHANGE UP (ref 0–0)
NT-PROBNP SERPL-SCNC: 150 PG/ML — SIGNIFICANT CHANGE UP (ref 0–300)
PLATELET # BLD AUTO: 243 K/UL — SIGNIFICANT CHANGE UP (ref 130–400)
PMV BLD: 8.8 FL — SIGNIFICANT CHANGE UP (ref 7.4–10.4)
POTASSIUM SERPL-MCNC: 5.1 MMOL/L — HIGH (ref 3.5–5)
POTASSIUM SERPL-SCNC: 5.1 MMOL/L — HIGH (ref 3.5–5)
PROT SERPL-MCNC: 7 G/DL — SIGNIFICANT CHANGE UP (ref 6–8)
RBC # BLD: 5.55 M/UL — SIGNIFICANT CHANGE UP (ref 4.7–6.1)
RBC # FLD: 12 % — SIGNIFICANT CHANGE UP (ref 11.5–14.5)
SODIUM SERPL-SCNC: 141 MMOL/L — SIGNIFICANT CHANGE UP (ref 135–146)
TROPONIN T SERPL-MCNC: 0.11 NG/ML — CRITICAL HIGH
TROPONIN T SERPL-MCNC: 0.14 NG/ML — CRITICAL HIGH
TROPONIN T SERPL-MCNC: 0.19 NG/ML — CRITICAL HIGH
TROPONIN T SERPL-MCNC: 0.23 NG/ML — CRITICAL HIGH
WBC # BLD: 9.95 K/UL — SIGNIFICANT CHANGE UP (ref 4.8–10.8)
WBC # FLD AUTO: 9.95 K/UL — SIGNIFICANT CHANGE UP (ref 4.8–10.8)

## 2023-07-11 PROCEDURE — 92978 ENDOLUMINL IVUS OCT C 1ST: CPT | Mod: RC

## 2023-07-11 PROCEDURE — C1894: CPT

## 2023-07-11 PROCEDURE — 99223 1ST HOSP IP/OBS HIGH 75: CPT

## 2023-07-11 PROCEDURE — 93010 ELECTROCARDIOGRAM REPORT: CPT | Mod: 76

## 2023-07-11 PROCEDURE — C1874: CPT

## 2023-07-11 PROCEDURE — C1769: CPT

## 2023-07-11 PROCEDURE — 86900 BLOOD TYPING SEROLOGIC ABO: CPT

## 2023-07-11 PROCEDURE — 85025 COMPLETE CBC W/AUTO DIFF WBC: CPT

## 2023-07-11 PROCEDURE — C1753: CPT

## 2023-07-11 PROCEDURE — 99285 EMERGENCY DEPT VISIT HI MDM: CPT

## 2023-07-11 PROCEDURE — 71045 X-RAY EXAM CHEST 1 VIEW: CPT | Mod: 26

## 2023-07-11 PROCEDURE — 85730 THROMBOPLASTIN TIME PARTIAL: CPT

## 2023-07-11 PROCEDURE — 85610 PROTHROMBIN TIME: CPT

## 2023-07-11 PROCEDURE — 80053 COMPREHEN METABOLIC PANEL: CPT

## 2023-07-11 PROCEDURE — 83735 ASSAY OF MAGNESIUM: CPT

## 2023-07-11 PROCEDURE — 92928 PRQ TCAT PLMT NTRAC ST 1 LES: CPT | Mod: RC

## 2023-07-11 PROCEDURE — 84484 ASSAY OF TROPONIN QUANT: CPT

## 2023-07-11 PROCEDURE — 86901 BLOOD TYPING SEROLOGIC RH(D): CPT

## 2023-07-11 PROCEDURE — 93306 TTE W/DOPPLER COMPLETE: CPT

## 2023-07-11 PROCEDURE — 86803 HEPATITIS C AB TEST: CPT

## 2023-07-11 PROCEDURE — C1887: CPT

## 2023-07-11 PROCEDURE — 80061 LIPID PANEL: CPT

## 2023-07-11 PROCEDURE — 36415 COLL VENOUS BLD VENIPUNCTURE: CPT

## 2023-07-11 PROCEDURE — C1725: CPT

## 2023-07-11 PROCEDURE — 84443 ASSAY THYROID STIM HORMONE: CPT

## 2023-07-11 PROCEDURE — 93458 L HRT ARTERY/VENTRICLE ANGIO: CPT | Mod: 59

## 2023-07-11 PROCEDURE — 93010 ELECTROCARDIOGRAM REPORT: CPT | Mod: 77

## 2023-07-11 PROCEDURE — 93005 ELECTROCARDIOGRAM TRACING: CPT

## 2023-07-11 PROCEDURE — 86850 RBC ANTIBODY SCREEN: CPT

## 2023-07-11 PROCEDURE — 83036 HEMOGLOBIN GLYCOSYLATED A1C: CPT

## 2023-07-11 RX ORDER — SODIUM CHLORIDE 9 MG/ML
500 INJECTION INTRAMUSCULAR; INTRAVENOUS; SUBCUTANEOUS ONCE
Refills: 0 | Status: COMPLETED | OUTPATIENT
Start: 2023-07-11 | End: 2023-07-11

## 2023-07-11 RX ORDER — ASPIRIN/CALCIUM CARB/MAGNESIUM 324 MG
243 TABLET ORAL DAILY
Refills: 0 | Status: DISCONTINUED | OUTPATIENT
Start: 2023-07-11 | End: 2023-07-11

## 2023-07-11 RX ORDER — HEPARIN SODIUM 5000 [USP'U]/ML
INJECTION INTRAVENOUS; SUBCUTANEOUS
Qty: 25000 | Refills: 0 | Status: DISCONTINUED | OUTPATIENT
Start: 2023-07-11 | End: 2023-07-12

## 2023-07-11 RX ORDER — HEPARIN SODIUM 5000 [USP'U]/ML
5000 INJECTION INTRAVENOUS; SUBCUTANEOUS ONCE
Refills: 0 | Status: COMPLETED | OUTPATIENT
Start: 2023-07-11 | End: 2023-07-12

## 2023-07-11 RX ORDER — ASPIRIN/CALCIUM CARB/MAGNESIUM 324 MG
81 TABLET ORAL DAILY
Refills: 0 | Status: DISCONTINUED | OUTPATIENT
Start: 2023-07-11 | End: 2023-07-11

## 2023-07-11 RX ORDER — ACETAMINOPHEN 500 MG
650 TABLET ORAL EVERY 6 HOURS
Refills: 0 | Status: DISCONTINUED | OUTPATIENT
Start: 2023-07-11 | End: 2023-07-13

## 2023-07-11 RX ORDER — HEPARIN SODIUM 5000 [USP'U]/ML
6000 INJECTION INTRAVENOUS; SUBCUTANEOUS EVERY 6 HOURS
Refills: 0 | Status: DISCONTINUED | OUTPATIENT
Start: 2023-07-11 | End: 2023-07-12

## 2023-07-11 RX ORDER — ATORVASTATIN CALCIUM 80 MG/1
40 TABLET, FILM COATED ORAL AT BEDTIME
Refills: 0 | Status: DISCONTINUED | OUTPATIENT
Start: 2023-07-11 | End: 2023-07-13

## 2023-07-11 RX ORDER — LANOLIN ALCOHOL/MO/W.PET/CERES
3 CREAM (GRAM) TOPICAL AT BEDTIME
Refills: 0 | Status: DISCONTINUED | OUTPATIENT
Start: 2023-07-11 | End: 2023-07-13

## 2023-07-11 RX ORDER — ASPIRIN/CALCIUM CARB/MAGNESIUM 324 MG
81 TABLET ORAL DAILY
Refills: 0 | Status: DISCONTINUED | OUTPATIENT
Start: 2023-07-11 | End: 2023-07-13

## 2023-07-11 RX ORDER — FOLIC ACID 0.8 MG
1 TABLET ORAL DAILY
Refills: 0 | Status: DISCONTINUED | OUTPATIENT
Start: 2023-07-11 | End: 2023-07-13

## 2023-07-11 RX ORDER — NITROGLYCERIN 6.5 MG
0.4 CAPSULE, EXTENDED RELEASE ORAL
Refills: 0 | Status: DISCONTINUED | OUTPATIENT
Start: 2023-07-11 | End: 2023-07-11

## 2023-07-11 RX ADMIN — ATORVASTATIN CALCIUM 40 MILLIGRAM(S): 80 TABLET, FILM COATED ORAL at 21:43

## 2023-07-11 RX ADMIN — Medication 243 MILLIGRAM(S): at 09:29

## 2023-07-11 RX ADMIN — SODIUM CHLORIDE 500 MILLILITER(S): 9 INJECTION INTRAMUSCULAR; INTRAVENOUS; SUBCUTANEOUS at 09:29

## 2023-07-11 RX ADMIN — Medication 0.4 MILLIGRAM(S): at 15:02

## 2023-07-11 RX ADMIN — Medication 81 MILLIGRAM(S): at 11:24

## 2023-07-11 NOTE — H&P ADULT - ATTENDING COMMENTS
Pt seen and examined at bedside this morning. Still had chest pain but significantly improved. Pt scheduled for cardiac cath today for NSTEMI.

## 2023-07-11 NOTE — ED PROVIDER NOTE - PROGRESS NOTE DETAILS
co- pt became diaphoretic, light headed, rpt bp 80s systolic, FS 120s, rpt ekg nsr, no acute stemi, cards consulted given c/o cp and elevated trop. 500 ml bolus given w/ improvement of bp

## 2023-07-11 NOTE — CONSULT NOTE ADULT - ASSESSMENT
#NSTEMI  #Hx of CAD s/p MI 1998 s/p thrombolysis, 2vd on subsequent coronary angiogram  #Positive CCTA 9/2021 - moderate stenosis in mid LAD, LCx, possible severe stenosis in RCA  #Hx of HTN, HLD    Recommendations;  - S/p Aspirin load, c/w Aspirin 81 mg once daily for now  - Given pt's prior CCTA and known 2vd on prior coronary angiogram, has high concern for triple vessel disease  - Advise to hold off on Clopidogrel at this time  - Hold off on Heparin for now as pt without pain  - PLan for cardiac cath tomorrow 7/12 with Dr. Medel  - NPO after midnight  - Admit to tele #NSTEMI  #Hx of CAD s/p MI 1998 s/p thrombolysis, 2vd on subsequent coronary angiogram  #Positive CCTA 9/2021 - moderate stenosis in mid LAD, LCx, possible severe stenosis in RCA  #Hx of HTN, HLD      Recommendations:  - S/p Aspirin load, c/w Aspirin 81 mg daily for now  - No Plavix at this time (will load in cath lab)  - Hold off on IV Heparin for now as pt is without pain  - Cardiac cath tomorrow 7/12 with Dr. Medel  - NPO after midnight  - Admit to tele

## 2023-07-11 NOTE — ED ADULT NURSE REASSESSMENT NOTE - NS ED NURSE REASSESS COMMENT FT1
At approx 9am pt became diaphoretic, dizzy, bradycardic, hypotensive, FS was normal, MD notified, repeat ekg was performed, bolus of fluids was administered, and troponin was repeated, showing 2nd slightly elevated troponin. BP soon corrected. Pt admitted to telemetry for NSTEMI, atypical chest pain. Currently pt reports mostly improved, cool extremities. Report given to ED4 JUAN JOSE Gutierrez.

## 2023-07-11 NOTE — CONSULT NOTE ADULT - ATTENDING COMMENTS
Patient seen / examined and plan amended above.    NSTEMI  CAD with Unstable Angina    [facility] tomorrow 7/12
02-Jun-2018

## 2023-07-11 NOTE — ED PROVIDER NOTE - OBJECTIVE STATEMENT
61 y/o M with PMH CAD (MI 1998), HLD, HTN presenting for constant pressure-like pain across upper chest that began at 3am this morning. Now improved from onset. Pt states he was awake and praying when pain began. Not a/w SOB, N/V, sweating, back pain, neck pain, arm pain. Pt sees Dr. Medel, last seen in March 2023 and had echo which he states was normal; had CCTA and stress test within last few years but is not sure of date or results - knows that it was normal enough for Dr. Medel not to act on it. Pt reports he speed walks daily, and has not had any symptoms while speed walking the last few days. Also reports he "pulled something" in his neck last week and thinks current pain may be related. Denies worsened or changing pain with movement of torso or arms.

## 2023-07-11 NOTE — ED ADULT TRIAGE NOTE - WEIGHT METHOD
[id number]: Jaymie Morales    Procedure(s):  DIAGNOSTIC LAPAROSCOPY, EVACUATION OF HEMOPERITONEUM, LIGATION OF HEPATIC VESSEL    Diagnosis:* No pre-op diagnosis entered *  Diagnosis Additional Information: No value filed.    Anesthesia Type:  General    Note:  Disposition: Inpatient   Postop Pain Control: Uneventful            Sign Out: Well controlled pain   PONV: No   Neuro/Psych: Uneventful            Sign Out: Acceptable/Baseline neuro status   Airway/Respiratory: Uneventful            Sign Out: Acceptable/Baseline resp. status   CV/Hemodynamics: Uneventful            Sign Out: Acceptable CV status   Other NRE: NONE   DID A NON-ROUTINE EVENT OCCUR?          Last vitals:  Vitals:    04/02/21 0200 04/02/21 0215 04/02/21 0225   BP:  111/55    Pulse: 60 61 58   Resp: 12 13 13   Temp:   98.3  F (36.8  C)   SpO2: 100% 100% 100%       Last vitals prior to Anesthesia Care Transfer:  CRNA VITALS  4/2/2021 0039 - 4/2/2021 0139      4/2/2021             Pulse:  87    SpO2:  100 %    Resp Rate (observed):  (!) 5          Electronically Signed By: Doug Miranda MD  April 2, 2021  2:52 AM   Lateral                  Rotation              Squats  mini                     wall                    [facility]              Lunges:  Lunge to Balance                      Balance to Lunge                      Walking              Knee Extension Bilat. Ecc.                                  Inv. Hamstring Curls Bilat. 40# 3x10 40# 3x10   50# 2x10                               Ecc.                                  Inv.              Soleus Press Bilat. Ecc.                              Inv.                                      Ladders                   Square                  Jump/Hop  Low                         Med.                         High                                                                     Modality ES/VPulse 15' ES/Vpulse 15' Declined ES/CP 15'    Initials                             JLW SA  BMG SA stated

## 2023-07-11 NOTE — ED PROVIDER NOTE - CLINICAL SUMMARY MEDICAL DECISION MAKING FREE TEXT BOX
Throughout ED observation period, pt remained clinically and hemodynamically stable.  ekg w/o acute ischaemic findings  pt briefly diaphoretic, hypotensive, rpt ekg w/o STEMI, BP improved w/ small fluid bolus  cards consulted, pt admitted to [facility]to [facility] for further monitoring, w/u, management

## 2023-07-11 NOTE — H&P ADULT - NSHPPHYSICALEXAM_GEN_ALL_CORE
GA: alert oriented  Heart: normal s1 s2  Lungs: clear air sounds  Abdomen: soft non tender  LE: no edema

## 2023-07-11 NOTE — ED PROVIDER NOTE - ATTENDING CONTRIBUTION TO CARE
61 yo m hx cad, hld   pt presents for eval of CP. CP started at 3am. pain across chest, pressure like, constant w/ gradual improvement since. no radiation of pain. no n/v/diaphoresis/sob. no fevers/chills/cough. no radiation to back. pt unsure of last cardiac workup.    vss  gen- NAD, aaox3  card-rrr  lungs-ctab, no wheezing or rhonchi  abd-sntnd, no guarding or rebound  neuro- full str/sensation, cn ii-xii grossly intact, normal coordination

## 2023-07-11 NOTE — ED ADULT TRIAGE NOTE - NS ED NURSE BANDS TYPE
Subjective:      Quan Quesada is a 52 y.o. male who presents with Ankle Injury (x 2-3 days, twisted Lt. ankle, swelling and very painful)        HPI    The patient presents to urgent care today with complaints of left ankle and foot injury. States that he was walking 2 days ago when he accidentally tripped. When he did so, his left foot rolled and inverted. He immediately developed pain to his medial ankle and foot. He has had pain since. Admits to associated swelling. He denies numbness or tingling. Denies previous injuries to this foot or ankle. He has tried using crutches, ice, and Aleve for symptom relief. He denies any other areas of injury or trauma. Pain is currently rated 8/10 at rest, 10/10 with ambulation.    Past Medical History:   Diagnosis Date   • Arrhythmia 2010    SVT only once     • Gout    • Indigestion    • Kidney stones 2007    stones  reoccuring gout   • Snoring      Past Surgical History:   Procedure Laterality Date   • VENTRAL HERNIA REPAIR  1/9/2015    Performed by Romel Wilson M.D. at SURGERY [facility]   • UMBILICAL HERNIA REPAIR  1/9/2015    Performed by Romel Wilson M.D. at SURGERY [facility]   • OTHER  1969    hernia umbilical   • OTHER      cosmetic forehead   • PB APPENDECTOMY      age 12     Current Outpatient Prescriptions on File Prior to Visit   Medication Sig Dispense Refill   • gabapentin (NEURONTIN) 300 MG Cap Take 1 Cap by mouth 3 times a day. 90 Cap 2   • oxycodone-acetaminophen (PERCOCET) 7.5-325 MG per tablet Take 1-2 Tabs by mouth every four hours as needed for Moderate Pain ((pain scale 4-6)). 40 Tab 0     No current facility-administered medications on file prior to visit.      Patient has no known allergies.      Review of Systems   Constitutional: Negative.    HENT: Negative.    Respiratory: Negative.    Cardiovascular: Negative.    Gastrointestinal: Negative.    Musculoskeletal: Positive for joint pain. Negative for back pain and neck pain.   Skin:  "Negative.    Neurological: Negative.  Negative for tingling, sensory change and focal weakness.   Psychiatric/Behavioral: Negative.           Objective:     /86   Pulse 99   Temp 36.2 °C (97.2 °F)   Resp 16   Ht 1.803 m (5' 10.98\")   Wt 108.4 kg (239 lb)   SpO2 96%   BMI 33.35 kg/m²      Physical Exam   Constitutional: He is oriented to person, place, and time. Vital signs are normal. He appears well-developed and well-nourished. He does not have a sickly appearance. He does not appear ill. No distress.   HENT:   Head: Normocephalic and atraumatic.   Right Ear: External ear normal.   Left Ear: External ear normal.   Nose: Nose normal.   Mouth/Throat: Oropharynx is clear and moist.   Eyes: Conjunctivae are normal. Pupils are equal, round, and reactive to light. Right eye exhibits no discharge. Left eye exhibits no discharge.   Neck: Normal range of motion. Neck supple. No JVD present.   Cardiovascular: Normal rate, regular rhythm, normal heart sounds and intact distal pulses.    Pulmonary/Chest: Effort normal and breath sounds normal. No stridor. No respiratory distress.   Musculoskeletal: He exhibits edema and tenderness. He exhibits no deformity.        Left ankle: He exhibits decreased range of motion and swelling. He exhibits no ecchymosis, no deformity, no laceration and normal pulse. Tenderness. Medial malleolus, CF ligament and posterior TFL tenderness found. Achilles tendon normal.        Left foot: There is decreased range of motion, tenderness, bony tenderness and swelling. There is normal capillary refill, no crepitus, no deformity and no laceration.        Feet:    Lymphadenopathy:     He has no cervical adenopathy.   Neurological: He is alert and oriented to person, place, and time. He has normal strength. No cranial nerve deficit or sensory deficit. He exhibits normal muscle tone. Gait (antalgic) abnormal. Coordination normal.   Skin: Skin is warm, dry and intact. Capillary refill takes " "less than 2 seconds. No abrasion, no bruising, no ecchymosis, no laceration and no rash noted. He is not diaphoretic. No erythema. No pallor.   Psychiatric: He has a normal mood and affect. His behavior is normal. Judgment and thought content normal.   Vitals reviewed.              Assessment/Plan:     1. Sprain of left ankle, unspecified ligament, initial encounter  DX-ANKLE 3+ VIEWS LEFT    REFERRAL TO SPORTS MEDICINE   2. Injury of left foot, initial encounter  DX-FOOT-COMPLETE 3+ LEFT    REFERRAL TO SPORTS MEDICINE         - DX-FOOT-COMPLETE 3+ LEFT reviewed by myself, radiology reading \"No evidence of acute fracture or dislocation.\"    - DX-ANKLE 3+ VIEWS LEFT reviewed by myself, radiology reading \"1.  No evidence of acute fracture or dislocation. Old post traumatic change of the medial and lateral malleoli. Soft tissue swelling. Joint effusion.\"      Foot x-rays negative for acute fracture. Ankle x-rays also negative for fracture but shows potential old traumatic injury of the medial alveolus. The patient denies known previous injuries to this area, therefore suspect potential new injury along with suspected sprain. Therefore the patient is placed in a walking boot and given crutches, instructed to remain nonweightbearing. Referral placed to sports medicine for follow-up next week. RICE. OTC NSAIDs.   Supportive care, differential diagnoses, and indications for immediate follow-up discussed with patient.   Pathogenesis of diagnosis discussed including typical length and natural progression.   Instructed to return to clinic or nearest emergency department for any change in condition, further concerns, or worsening of symptoms.  Patient states understanding of the plan of care and discharge instructions.        SHANI Mckenzie.          " Name band;

## 2023-07-11 NOTE — CONSULT NOTE ADULT - SUBJECTIVE AND OBJECTIVE BOX
Outpt cardiologist: Dr. Medel    HPI:  61 yo M with PMHx of CAD s/p MI in 1998 s/p thrombolysis at [place] (subsequent coronary angiogram showed 2vd, treated medically) HTN, HLD presents with acute onset chest pain. Pt reports he woke up at 3 am to pray, developed onset of mid sternal chest pain, radiated to mid back, not as severe as pain felt at time of MI. Pain self resolved after a few minutes, subsequently developed "sweats" and dizziness, then came to ED.    Of note, pt sees Dr. Medel, had a CCTA in 9/21 that showed moderate stenosis in mid LAD, LCx, possible severe stenosis in RCA. Was medically managed given pt was without symptoms.    Pt became hypotensive in ED, 80s/60, at the time endorsed dizziness and sweating and feels nervous. Symptoms resolved and BP came up, receiving 500 cc bolus on assessment. Bedside echo shows grossly normal LV fxn.    PAST MEDICAL & SURGICAL HISTORY  CAD (coronary artery disease)  Hyperlipidemia  Hypertension  No significant past surgical history    ALLERGIES:  streptomycin (Unknown)      MEDICATIONS:  aspirin 243 milliGRAM(s) Oral daily    PRN:      HOME MEDICATIONS:  Home Medications:      VITALS:   T(F): 97.8 (07-11 @ 08:30), Max: 98 (07-11 @ 05:11)  HR: 50 (07-11 @ 08:30) (50 - 98)  BP: 111/60 (07-11 @ 08:30) (111/60 - 169/96)  BP(mean): --  RR: 18 (07-11 @ 08:30) (18 - 18)  SpO2: 99% (07-11 @ 08:30) (99% - 100%)    I&O's Summary      REVIEW OF SYSTEMS:  CONSTITUTIONAL: No weakness, fevers or chills  HEENT: No visual changes, neck/ear pain  RESPIRATORY: No cough, sob  CARDIOVASCULAR: See HPI  GASTROINTESTINAL: No abdominal pain. No nausea, vomiting, diarrhea   GENITOURINARY: No dysuria, frequency or hematuria  NEUROLOGICAL: No new focal deficits  SKIN: No new rashes    PHYSICAL EXAM:  General: Not in distress.  Non-toxic appearing.   HEENT: - JVD  Cardio: regular, S1, S2, no murmur  Pulm: Clear air entry bilaterally  Abdomen: Soft, non-tender, non-distended, BS+  Extremities: No edema in bilateral LE  Neuro: A&O x3. No focal deficits      LABS:                        16.7   9.95  )-----------( 243      ( 11 Jul 2023 07:35 )             49.1     07-11    141  |  107  |  17  ----------------------------<  114<H>  5.1<H>   |  23  |  1.1    Ca    9.1      11 Jul 2023 07:35    TPro  7.0  /  Alb  4.7  /  TBili  0.7  /  DBili  x   /  AST  35  /  ALT  46<H>  /  AlkPhos  96  07-11      Troponin T, Serum: 0.11 ng/mL *HH* (07-11-23 @ 07:35)    CARDIAC MARKERS ( 11 Jul 2023 07:35 )  x     / 0.11 ng/mL / x     / x     / x            Troponin trend:            RADIOLOGY:  -CXR: Unremarkable  -CCTA: 9/21 - moderate stenosis in mid LAD, LCx, possible severe stenosis in RCA.  -STRESS TEST: Exercise stress echo in 9/21 negative      ECG:  No acute ischemic changes       Outpt cardiologist: Dr. Medel    HPI:  61 yo M with PMHx of CAD s/p MI in 1998 s/p thrombolysis at [place] (subsequent coronary angiogram showed 2vd, treated medically) HTN, HLD presents with acute onset chest pain. Pt reports he woke up at 3 am to pray, developed onset of mid sternal chest pain, radiated to mid back, not as severe as pain felt at time of MI. Pain self resolved after a few minutes, subsequently developed "sweats" and dizziness, then came to ED.    Of note, pt sees Dr. Medel, had a CCTA in 9/21 that showed moderate stenosis in mid LAD, LCx, possible severe stenosis in RCA. Was medically managed given pt was without symptoms.    Pt became hypotensive in ED, 80s/60, at the time endorsed dizziness and sweating and feels nervous. Symptoms resolved and BP came up, receiving 500 cc bolus on assessment. Bedside echo shows grossly normal LV fxn.      PAST MEDICAL & SURGICAL HISTORY  CAD (coronary artery disease)  Hyperlipidemia  Hypertension  No significant past surgical history    ALLERGIES:  streptomycin (Unknown)      MEDICATIONS:  aspirin 243 milliGRAM(s) Oral daily      VITALS:   T(F): 97.8 (07-11 @ 08:30), Max: 98 (07-11 @ 05:11)  HR: 50 (07-11 @ 08:30) (50 - 98)  BP: 111/60 (07-11 @ 08:30) (111/60 - 169/96)  BP(mean): --  RR: 18 (07-11 @ 08:30) (18 - 18)  SpO2: 99% (07-11 @ 08:30) (99% - 100%)      REVIEW OF SYSTEMS:  CONSTITUTIONAL: No fever, weight loss, fatigue  NECK: No pain or stiffness  RESPIRATORY: See HPI  CARDIOVASCULAR: See HPI  GASTROINTESTINAL: No abdominal/epigastric pain, nausea, vomiting, hematemesis, diarrhea, constipation, melena or hematochezia  GENITOURINARY: No dysuria, frequency, hematuria, incontinence  NEUROLOGICAL: No headaches, memory loss, loss of strength, numbness, tremors  SKIN: No itching, burning, rashes, lesions   ENDOCRINE: No heat/cold intolerance or hair loss  MUSCULOSKELETAL: No joint pain or swelling  HEME/LYMPH: No easy bruising or bleeding gums    PHYSICAL EXAM:  General: Not in distress.  Non-toxic appearing.   HEENT: - JVD  Cardio: regular, S1, S2, no murmur  Pulm: Clear air entry bilaterally  Abdomen: Soft, non-tender, non-distended  Extremities: No edema in bilateral LE  Neuro: A&O x3. No focal deficits      LABS:                        16.7   9.95  )-----------( 243      ( 11 Jul 2023 07:35 )             49.1     07-11    141  |  107  |  17  ----------------------------<  114<H>  5.1<H>   |  23  |  1.1    Ca    9.1      11 Jul 2023 07:35    TPro  7.0  /  Alb  4.7  /  TBili  0.7  /  DBili  x   /  AST  35  /  ALT  46<H>  /  AlkPhos  96  07-11      Troponin T, Serum: 0.11 ng/mL *HH* (07-11-23 @ 07:35)        RADIOLOGY:  -CXR: Unremarkable  -CCTA: 9/21 - moderate stenosis in mid LAD, LCx, possible severe stenosis in RCA.  -STRESS TEST: Exercise stress echo in 9/21 negative      ECG:  No acute ischemic changes

## 2023-07-11 NOTE — ED ADULT NURSE REASSESSMENT NOTE - NS ED NURSE REASSESS COMMENT FT1
pt calm, resting in bed, AO4, denies CP, VB stable, fall precautions maintained, cardiac monitoring maintained. No complaints at this time.

## 2023-07-11 NOTE — H&P ADULT - NSHPLABSRESULTS_GEN_ALL_CORE
Complete Blood Count + Automated Diff (07.11.23 @ 07:35)   WBC Count: 9.95 K/uL  RBC Count: 5.55 M/uL  Hemoglobin: 16.7 g/dL  Hematocrit: 49.1 %  Mean Cell Volume: 88.5 fL  Mean Cell Hemoglobin: 30.1 pg  Mean Cell Hemoglobin Conc: 34.0 g/dL  Red Cell Distrib Width: 12.0 %  Platelet Count - Automated: 243 K/uL  MPV: 8.8 fL  Auto Neutrophil #: 6.76 K/uL  Auto Lymphocyte #: 2.16 K/uL  Auto Monocyte #: 0.60 K/uL  Auto Eosinophil #: 0.35 K/uL  Auto Basophil #: 0.04 K/uL  Auto Neutrophil %: 68.0: Differential percentages must be correlated with absolute numbers for   clinical significance. %  Auto Lymphocyte %: 21.7 %  Auto Monocyte %: 6.0 %  Auto Eosinophil %: 3.5 %  Auto Basophil %: 0.4 %  Auto Immature Granulocyte %: 0.4: (Includes meta, myelo and promyelocytes). Mild elevations in immature Comprehensive Metabolic Panel (07.11.23 @ 07:35)   Sodium: 141 mmol/L  Potassium: 5.1: Hemolyzed. Interpret with caution mmol/L  Chloride: 107 mmol/L  Carbon Dioxide: 23 mmol/L  Anion Gap: 11 mmol/L  Blood Urea Nitrogen: 17 mg/dL  Creatinine: 1.1 mg/dL  Glucose: 114 mg/dL  Calcium: 9.1 mg/dL  Protein Total: 7.0 g/dL  Albumin: 4.7 g/dL  Bilirubin Total: 0.7 mg/dL  Alkaline Phosphatase: 96 U/L  Aspartate Aminotransferase (AST/SGOT): 35: Hemolyzed. Interpret with caution U/L  Alanine Aminotransferase (ALT/SGPT): 46: Hemolyzed. Interpret with caution U/L  eGFR: 76: The estimated glomerular filtration rate (eGFR) is calculated using the Troponin T, Serum (07.11.23 @ 10:16)   Troponin T, Serum: 0.14: Critical value: ng/mL      Historical Values  Troponin T, Serum (07.11.23 @ 10:16)   Troponin T, Serum: 0.14: Critical value: ng/mL  Troponin T, Serum (07.11.23 @ 07:35)   Troponin T, Serum: 0.11: TYPE:(C=Critical, N=Notification, A=Abnormal)

## 2023-07-11 NOTE — ED PROVIDER NOTE - PHYSICAL EXAMINATION
CONSTITUTIONAL: Well-developed; well-nourished; in no acute distress.   SKIN: Warm, dry  HEAD: Normocephalic; atraumatic  EYES: PERRL, EOMI, normal sclera and conjunctiva. No pallor.  ENT: No nasal discharge; airway clear. MMM  NECK: Supple; non tender. Posterior neck erythematous spots, since birth per pt, nontender  CARD:  Regular rate and rhythm. Normal S1, S2. No reproducible chest pain. 2+ radial pulses. Normal capillary refill. Trace LE edema b/l.  RESP: No increased WOB. CTA b/l without wheezes, crackles, rhonchi. No CVA tenderness.   ABD: Normoactive BS. Soft, nontender, nondistended.  EXT: Normal ROM.   NEURO: Alert, oriented, grossly unremarkable  PSYCH: Cooperative, appropriate.

## 2023-07-11 NOTE — H&P ADULT - HISTORY OF PRESENT ILLNESS
61 yo M with PMHx of CAD s/p MI in 1998 at [facility] (subsequent coronary angiogram showed 2vd, treated medically) HTN, HLD presents with acute onset chest pain. Pt reports he woke up at 3 am to pray, developed onset of mid sternal chest pain, radiated to mid back, not as severe as pain felt at time of MI. Pain self resolved after a few minutes, subsequently developed cold sweating and dizziness, then came to ED.    Of note, pt sees Dr. Medel, had a CCTA in 9/21 that showed moderate stenosis in mid LAD, LCx, possible severe stenosis in RCA. Was medically managed given pt was without symptoms.    In the ED, pt became hypotensive, 80s/60 and bradycardic, at the time endorsed dizziness and sweating and feels nervous. Symptoms resolved and BP came up, receiving 500 cc bolus on assessment. Patient had labwork showing trop 0.11, 0.14. EKG showed sinus bradcardia without signs of ischemia.

## 2023-07-12 LAB
A1C WITH ESTIMATED AVERAGE GLUCOSE RESULT: 5.6 % — SIGNIFICANT CHANGE UP (ref 4–5.6)
ALBUMIN SERPL ELPH-MCNC: 4.1 G/DL — SIGNIFICANT CHANGE UP (ref 3.5–5.2)
ALP SERPL-CCNC: 80 U/L — SIGNIFICANT CHANGE UP (ref 30–115)
ALT FLD-CCNC: 42 U/L — HIGH (ref 0–41)
ANION GAP SERPL CALC-SCNC: 14 MMOL/L — SIGNIFICANT CHANGE UP (ref 7–14)
APTT BLD: 60.2 SEC — HIGH (ref 27–39.2)
AST SERPL-CCNC: 64 U/L — HIGH (ref 0–41)
BASOPHILS # BLD AUTO: 0.04 K/UL — SIGNIFICANT CHANGE UP (ref 0–0.2)
BASOPHILS NFR BLD AUTO: 0.3 % — SIGNIFICANT CHANGE UP (ref 0–1)
BILIRUB SERPL-MCNC: 0.8 MG/DL — SIGNIFICANT CHANGE UP (ref 0.2–1.2)
BLD GP AB SCN SERPL QL: SIGNIFICANT CHANGE UP
BUN SERPL-MCNC: 15 MG/DL — SIGNIFICANT CHANGE UP (ref 10–20)
CALCIUM SERPL-MCNC: 8.9 MG/DL — SIGNIFICANT CHANGE UP (ref 8.4–10.5)
CHLORIDE SERPL-SCNC: 107 MMOL/L — SIGNIFICANT CHANGE UP (ref 98–110)
CHOLEST SERPL-MCNC: 149 MG/DL — SIGNIFICANT CHANGE UP
CO2 SERPL-SCNC: 22 MMOL/L — SIGNIFICANT CHANGE UP (ref 17–32)
CREAT SERPL-MCNC: 1.1 MG/DL — SIGNIFICANT CHANGE UP (ref 0.7–1.5)
EGFR: 76 ML/MIN/1.73M2 — SIGNIFICANT CHANGE UP
EOSINOPHIL # BLD AUTO: 0.13 K/UL — SIGNIFICANT CHANGE UP (ref 0–0.7)
EOSINOPHIL NFR BLD AUTO: 0.9 % — SIGNIFICANT CHANGE UP (ref 0–8)
ESTIMATED AVERAGE GLUCOSE: 114 MG/DL — SIGNIFICANT CHANGE UP (ref 68–114)
GLUCOSE SERPL-MCNC: 122 MG/DL — HIGH (ref 70–99)
HCT VFR BLD CALC: 43.8 % — SIGNIFICANT CHANGE UP (ref 42–52)
HCV AB S/CO SERPL IA: 0.04 COI — SIGNIFICANT CHANGE UP
HCV AB SERPL-IMP: SIGNIFICANT CHANGE UP
HDLC SERPL-MCNC: 39 MG/DL — LOW
HGB BLD-MCNC: 14.7 G/DL — SIGNIFICANT CHANGE UP (ref 14–18)
IMM GRANULOCYTES NFR BLD AUTO: 0.7 % — HIGH (ref 0.1–0.3)
INR BLD: 1 RATIO — SIGNIFICANT CHANGE UP (ref 0.65–1.3)
LIPID PNL WITH DIRECT LDL SERPL: 77 MG/DL — SIGNIFICANT CHANGE UP
LYMPHOCYTES # BLD AUTO: 16.6 % — LOW (ref 20.5–51.1)
LYMPHOCYTES # BLD AUTO: 2.32 K/UL — SIGNIFICANT CHANGE UP (ref 1.2–3.4)
MAGNESIUM SERPL-MCNC: 2.2 MG/DL — SIGNIFICANT CHANGE UP (ref 1.8–2.4)
MCHC RBC-ENTMCNC: 30.2 PG — SIGNIFICANT CHANGE UP (ref 27–31)
MCHC RBC-ENTMCNC: 33.6 G/DL — SIGNIFICANT CHANGE UP (ref 32–37)
MCV RBC AUTO: 89.9 FL — SIGNIFICANT CHANGE UP (ref 80–94)
MONOCYTES # BLD AUTO: 0.81 K/UL — HIGH (ref 0.1–0.6)
MONOCYTES NFR BLD AUTO: 5.8 % — SIGNIFICANT CHANGE UP (ref 1.7–9.3)
NEUTROPHILS # BLD AUTO: 10.6 K/UL — HIGH (ref 1.4–6.5)
NEUTROPHILS NFR BLD AUTO: 75.7 % — HIGH (ref 42.2–75.2)
NON HDL CHOLESTEROL: 110 MG/DL — SIGNIFICANT CHANGE UP
NRBC # BLD: 0 /100 WBCS — SIGNIFICANT CHANGE UP (ref 0–0)
PLATELET # BLD AUTO: 224 K/UL — SIGNIFICANT CHANGE UP (ref 130–400)
PMV BLD: 8.9 FL — SIGNIFICANT CHANGE UP (ref 7.4–10.4)
POTASSIUM SERPL-MCNC: 4.5 MMOL/L — SIGNIFICANT CHANGE UP (ref 3.5–5)
POTASSIUM SERPL-SCNC: 4.5 MMOL/L — SIGNIFICANT CHANGE UP (ref 3.5–5)
PROT SERPL-MCNC: 6 G/DL — SIGNIFICANT CHANGE UP (ref 6–8)
PROTHROM AB SERPL-ACNC: 11.4 SEC — SIGNIFICANT CHANGE UP (ref 9.95–12.87)
RBC # BLD: 4.87 M/UL — SIGNIFICANT CHANGE UP (ref 4.7–6.1)
RBC # FLD: 12.5 % — SIGNIFICANT CHANGE UP (ref 11.5–14.5)
SODIUM SERPL-SCNC: 143 MMOL/L — SIGNIFICANT CHANGE UP (ref 135–146)
TRIGL SERPL-MCNC: 165 MG/DL — HIGH
TROPONIN T SERPL-MCNC: 0.78 NG/ML — CRITICAL HIGH
TSH SERPL-MCNC: 1.3 UIU/ML — SIGNIFICANT CHANGE UP (ref 0.27–4.2)
WBC # BLD: 14 K/UL — HIGH (ref 4.8–10.8)
WBC # FLD AUTO: 14 K/UL — HIGH (ref 4.8–10.8)

## 2023-07-12 PROCEDURE — 99223 1ST HOSP IP/OBS HIGH 75: CPT

## 2023-07-12 PROCEDURE — 92978 ENDOLUMINL IVUS OCT C 1ST: CPT | Mod: 26,RC

## 2023-07-12 PROCEDURE — 93458 L HRT ARTERY/VENTRICLE ANGIO: CPT | Mod: 26,XU

## 2023-07-12 PROCEDURE — 92928 PRQ TCAT PLMT NTRAC ST 1 LES: CPT | Mod: RC

## 2023-07-12 PROCEDURE — 93010 ELECTROCARDIOGRAM REPORT: CPT

## 2023-07-12 RX ORDER — LOSARTAN POTASSIUM 100 MG/1
50 TABLET, FILM COATED ORAL AT BEDTIME
Refills: 0 | Status: DISCONTINUED | OUTPATIENT
Start: 2023-07-13 | End: 2023-07-13

## 2023-07-12 RX ORDER — TICAGRELOR 90 MG/1
90 TABLET ORAL EVERY 12 HOURS
Refills: 0 | Status: DISCONTINUED | OUTPATIENT
Start: 2023-07-12 | End: 2023-07-13

## 2023-07-12 RX ORDER — SODIUM CHLORIDE 9 MG/ML
1050 INJECTION INTRAMUSCULAR; INTRAVENOUS; SUBCUTANEOUS
Refills: 0 | Status: DISCONTINUED | OUTPATIENT
Start: 2023-07-12 | End: 2023-07-13

## 2023-07-12 RX ORDER — METOPROLOL TARTRATE 50 MG
25 TABLET ORAL DAILY
Refills: 0 | Status: DISCONTINUED | OUTPATIENT
Start: 2023-07-13 | End: 2023-07-13

## 2023-07-12 RX ORDER — TICAGRELOR 90 MG/1
1 TABLET ORAL
Qty: 60 | Refills: 3
Start: 2023-07-12 | End: 2023-11-08

## 2023-07-12 RX ORDER — SODIUM CHLORIDE 9 MG/ML
250 INJECTION INTRAMUSCULAR; INTRAVENOUS; SUBCUTANEOUS
Refills: 0 | Status: DISCONTINUED | OUTPATIENT
Start: 2023-07-12 | End: 2023-07-13

## 2023-07-12 RX ADMIN — Medication 81 MILLIGRAM(S): at 11:53

## 2023-07-12 RX ADMIN — Medication 650 MILLIGRAM(S): at 21:53

## 2023-07-12 RX ADMIN — TICAGRELOR 90 MILLIGRAM(S): 90 TABLET ORAL at 21:53

## 2023-07-12 RX ADMIN — HEPARIN SODIUM 1000 UNIT(S)/HR: 5000 INJECTION INTRAVENOUS; SUBCUTANEOUS at 00:08

## 2023-07-12 RX ADMIN — ATORVASTATIN CALCIUM 40 MILLIGRAM(S): 80 TABLET, FILM COATED ORAL at 21:53

## 2023-07-12 RX ADMIN — SODIUM CHLORIDE 250 MILLILITER(S): 9 INJECTION INTRAMUSCULAR; INTRAVENOUS; SUBCUTANEOUS at 13:35

## 2023-07-12 RX ADMIN — Medication 650 MILLIGRAM(S): at 22:23

## 2023-07-12 RX ADMIN — HEPARIN SODIUM 5000 UNIT(S): 5000 INJECTION INTRAVENOUS; SUBCUTANEOUS at 00:07

## 2023-07-12 RX ADMIN — Medication 1 MILLIGRAM(S): at 11:53

## 2023-07-12 NOTE — CHART NOTE - NSCHARTNOTESELECT_GEN_ALL_CORE
Brief cath report/Event Note
Cardiology NP pre Cath note/Event Note
Interventional Cardiology NP/Event Note

## 2023-07-12 NOTE — CHART NOTE - NSCHARTNOTEFT_GEN_A_CORE
63 yo M with PMHx of CAD s/p MI in 1998 at [facility] (subsequent coronary angiogram showed 2vd, treated medically) HTN, HLD presents with acute onset chest pain. Pt reports he woke up at 3 am to pray, developed onset of mid sternal chest pain, radiated to mid back, not as severe as pain felt at time of MI. Pain self resolved after a few minutes, subsequently developed cold sweating and dizziness, then came to ED.    Of note, pt sees Dr. Medel, had a CCTA in 9/21 that showed moderate stenosis in mid LAD, LCx, possible severe stenosis in RCA. Was medically managed given pt was without symptoms.    In the ED, pt became hypotensive, 80s/60 and bradycardic, at the time endorsed dizziness and sweating and feels nervous. Symptoms resolved and BP came up, receiving 500 cc bolus on assessment. Patient had lab work showing trop 0.11, 0.14. EKG showed sinus bradycardia without signs of ischemia.                                             PREOPERATIVE DAY OF PROCEDURE EVALUATION:  I have personally seen and examined the patient. I agree with the history and physical which I have reviewed and noted any changes below. ( to be signed electronically by MD ) 07-12-23 @ 13:26    IV NS given prior Cardiac Cath [x ]  CathPCI Bleeding Risk score is:  0.7%       RIGHT RADIAL ARTERY EVALUATION:  MARY TEST: [x ] Negative          [ ] Positive    Anti- Anginal medications:                        [ ] not used                       [ x] used:  [ ]CCB  [ x] BB  [ ] Nitrate [ ] Ranexa

## 2023-07-12 NOTE — CHART NOTE - NSCHARTNOTEFT_GEN_A_CORE
Patient pharmacy called in for Brilinta 90 mg PO q12hr x1 month x3 refills. Co-pay is $0 per month. Patient is agreeing for it. Medication is ready for a .

## 2023-07-12 NOTE — PATIENT PROFILE ADULT - FALL HARM RISK - HARM RISK INTERVENTIONS

## 2023-07-12 NOTE — CHART NOTE - NSCHARTNOTEFT_GEN_A_CORE
PRE-OP DIAGNOSIS:    NSTEMI    PROCEDURE:     [] Coronary Angiogram     [X] [facility]       [] Intervention (see below)         PHYSICIAN:  Dr. Medel    ASSISTANT:  Dr. Crason     PROCEDURE DESCRIPTION:     Consent:      [X] Patient     Anesthesia:     [X] Sedation     [X] Local        Access & Closure:     [x] 6 Fr Radial Artery , Radial vasc band    IV Contrast: 150 mL        Intervention:   Successful PCI of mid-RCA and distal -RCA.    Implants:    Mid-RCA: Synergy 4.0mmx 20mm  Distal RCA: Synergy 2.5mmx 38mm.    FINDINGS:     Coronary Dominance:       LM: Average size. Minor irregularities.    LAD:   P-LAD : Mild disease.  Mid-LAD has 90 percent disease.  Distal LAD: Moderate diffuse disease. Apical portion has severe disease, but that is a small artery.    CX:   P-LCX has minor irregularities.  Mid-LCX: mild disease  Distal LCX: Mild disease.  Om1 has mild disease.    Ramus: Ostial Ramus has 80 percent disease, Proximal ramus has 90 percent disease.    RCA:   P-RCA has moderate disease 50percent, heavily calcified.   Mid-RCA has 95 percent stenosis s/p successful PCI with JOIE.  Distal RCA: 100 percent occluded s/p successful PCI with JOIE.     LVEDP: 21 mmHg     EF: No recent echo- Echocardiogram is ordered.       ESTIMATED BLOOD LOSS: < 10 mL        CONDITION:     [X] Good     SPECIMEN REMOVED: N/A       POST-OP DIAGNOSIS:        [X] 3 vessel disease involving the RCA, Ramus intermedius and LAD.  PCI of mid and distal RCA.  Plan for the stage PCI of LAD as outpatient.     PLAN OF CARE:     [X] Return to In-patient bed     [X] Medications: Aspirin 81 mg daily, Brilinta 90 mg BID, BB, High intensity statin.  [X] IV Fluids:  Normal saline at 150 ml/hr for 6 hour. PRE-OP DIAGNOSIS:    NSTEMI    PROCEDURE:     [] Coronary Angiogram     [X] [facility]       [] Intervention (see below)         PHYSICIAN:  Dr. Medel    ASSISTANT:  Dr. Carson     PROCEDURE DESCRIPTION:     Consent:      [X] Patient     Anesthesia:     [X] Sedation     [X] Local        Access & Closure:     [x] 6 Fr Radial Artery , Radial vasc band    IV Contrast: 150 mL        Intervention:   Successful PCI of mid-RCA and distal -RCA.    Implants:    Mid-RCA: Synergy 4.0mmx 20mm  Distal RCA: Synergy 2.5mmx 38mm.    FINDINGS:     Coronary Dominance:       LM: Average size. Minor irregularities.    LAD:   P-LAD : Mild disease.  Mid-LAD has 90 percent disease.  Distal LAD: Moderate to severe diffuse disease. Apical portion has severe disease    CX:   P-LCX has minor irregularities.  Mid-LCX: mild disease  Distal LCX: Mild disease.  Om1 has mild disease.    Ramus: Ostial Ramus has 80 percent disease, Proximal ramus has 90 percent disease.    RCA:   P-RCA has moderate disease 50percent, heavily calcified.   Mid-RCA has 95 percent stenosis s/p successful PCI with JOIE.  Distal RCA: 100 percent occluded s/p successful PCI with JOEI.     LVEDP: 21 mmHg     EF: No recent echo- Echocardiogram is ordered.       ESTIMATED BLOOD LOSS: < 10 mL        CONDITION:     [X] Good     SPECIMEN REMOVED: N/A       POST-OP DIAGNOSIS:        [X] 3 vessel disease involving the RCA, Ramus intermedius and LAD.  PCI of mid and distal RCA.  Plan for the stage PCI of LAD as outpatient.     PLAN OF CARE:     [X] Return to In-patient bed     [X] Medications: Aspirin 81 mg daily, Brilinta 90 mg BID, BB, High intensity statin.  [X] IV Fluids:  Normal saline at 150 ml/hr for 6 hour.

## 2023-07-12 NOTE — PATIENT PROFILE ADULT - FALL HARM RISK - CONCLUSION
Pt has surgery scheduled with dr Elan Blakely tomorrow  She has a uti and chlamydia  Pat called louie to let her know   I assume you would want it cancelled~cd Fall with Harm Risk

## 2023-07-13 ENCOUNTER — TRANSCRIPTION ENCOUNTER (OUTPATIENT)
Age: 63
End: 2023-07-13

## 2023-07-13 VITALS
DIASTOLIC BLOOD PRESSURE: 65 MMHG | SYSTOLIC BLOOD PRESSURE: 119 MMHG | RESPIRATION RATE: 16 BRPM | HEART RATE: 68 BPM | TEMPERATURE: 97 F

## 2023-07-13 LAB
ALBUMIN SERPL ELPH-MCNC: 4.1 G/DL — SIGNIFICANT CHANGE UP (ref 3.5–5.2)
ALP SERPL-CCNC: 82 U/L — SIGNIFICANT CHANGE UP (ref 30–115)
ALT FLD-CCNC: 49 U/L — HIGH (ref 0–41)
ANION GAP SERPL CALC-SCNC: 12 MMOL/L — SIGNIFICANT CHANGE UP (ref 7–14)
AST SERPL-CCNC: 65 U/L — HIGH (ref 0–41)
BASOPHILS # BLD AUTO: 0.03 K/UL — SIGNIFICANT CHANGE UP (ref 0–0.2)
BASOPHILS NFR BLD AUTO: 0.3 % — SIGNIFICANT CHANGE UP (ref 0–1)
BILIRUB SERPL-MCNC: 1.4 MG/DL — HIGH (ref 0.2–1.2)
BUN SERPL-MCNC: 14 MG/DL — SIGNIFICANT CHANGE UP (ref 10–20)
CALCIUM SERPL-MCNC: 8.6 MG/DL — SIGNIFICANT CHANGE UP (ref 8.4–10.4)
CHLORIDE SERPL-SCNC: 106 MMOL/L — SIGNIFICANT CHANGE UP (ref 98–110)
CO2 SERPL-SCNC: 23 MMOL/L — SIGNIFICANT CHANGE UP (ref 17–32)
CREAT SERPL-MCNC: 1 MG/DL — SIGNIFICANT CHANGE UP (ref 0.7–1.5)
EGFR: 86 ML/MIN/1.73M2 — SIGNIFICANT CHANGE UP
EOSINOPHIL # BLD AUTO: 0.07 K/UL — SIGNIFICANT CHANGE UP (ref 0–0.7)
EOSINOPHIL NFR BLD AUTO: 0.6 % — SIGNIFICANT CHANGE UP (ref 0–8)
GLUCOSE SERPL-MCNC: 115 MG/DL — HIGH (ref 70–99)
HCT VFR BLD CALC: 42.3 % — SIGNIFICANT CHANGE UP (ref 42–52)
HGB BLD-MCNC: 14 G/DL — SIGNIFICANT CHANGE UP (ref 14–18)
IMM GRANULOCYTES NFR BLD AUTO: 0.5 % — HIGH (ref 0.1–0.3)
LYMPHOCYTES # BLD AUTO: 1.82 K/UL — SIGNIFICANT CHANGE UP (ref 1.2–3.4)
LYMPHOCYTES # BLD AUTO: 16.6 % — LOW (ref 20.5–51.1)
MAGNESIUM SERPL-MCNC: 2.2 MG/DL — SIGNIFICANT CHANGE UP (ref 1.8–2.4)
MCHC RBC-ENTMCNC: 29.8 PG — SIGNIFICANT CHANGE UP (ref 27–31)
MCHC RBC-ENTMCNC: 33.1 G/DL — SIGNIFICANT CHANGE UP (ref 32–37)
MCV RBC AUTO: 90 FL — SIGNIFICANT CHANGE UP (ref 80–94)
MONOCYTES # BLD AUTO: 0.89 K/UL — HIGH (ref 0.1–0.6)
MONOCYTES NFR BLD AUTO: 8.1 % — SIGNIFICANT CHANGE UP (ref 1.7–9.3)
NEUTROPHILS # BLD AUTO: 8.1 K/UL — HIGH (ref 1.4–6.5)
NEUTROPHILS NFR BLD AUTO: 73.9 % — SIGNIFICANT CHANGE UP (ref 42.2–75.2)
NRBC # BLD: 0 /100 WBCS — SIGNIFICANT CHANGE UP (ref 0–0)
PLATELET # BLD AUTO: 181 K/UL — SIGNIFICANT CHANGE UP (ref 130–400)
PMV BLD: 8.8 FL — SIGNIFICANT CHANGE UP (ref 7.4–10.4)
POTASSIUM SERPL-MCNC: 4.3 MMOL/L — SIGNIFICANT CHANGE UP (ref 3.5–5)
POTASSIUM SERPL-SCNC: 4.3 MMOL/L — SIGNIFICANT CHANGE UP (ref 3.5–5)
PROT SERPL-MCNC: 6.1 G/DL — SIGNIFICANT CHANGE UP (ref 6–8)
RBC # BLD: 4.7 M/UL — SIGNIFICANT CHANGE UP (ref 4.7–6.1)
RBC # FLD: 12.3 % — SIGNIFICANT CHANGE UP (ref 11.5–14.5)
SODIUM SERPL-SCNC: 141 MMOL/L — SIGNIFICANT CHANGE UP (ref 135–146)
WBC # BLD: 10.96 K/UL — HIGH (ref 4.8–10.8)
WBC # FLD AUTO: 10.96 K/UL — HIGH (ref 4.8–10.8)

## 2023-07-13 PROCEDURE — 99238 HOSP IP/OBS DSCHRG MGMT 30/<: CPT | Mod: GC

## 2023-07-13 PROCEDURE — 93306 TTE W/DOPPLER COMPLETE: CPT | Mod: 26

## 2023-07-13 PROCEDURE — 93010 ELECTROCARDIOGRAM REPORT: CPT

## 2023-07-13 RX ORDER — ATORVASTATIN CALCIUM 80 MG/1
1 TABLET, FILM COATED ORAL
Refills: 0 | DISCHARGE

## 2023-07-13 RX ORDER — ATORVASTATIN CALCIUM 80 MG/1
1 TABLET, FILM COATED ORAL
Qty: 30 | Refills: 0
Start: 2023-07-13 | End: 2023-08-11

## 2023-07-13 RX ADMIN — Medication 1 MILLIGRAM(S): at 12:13

## 2023-07-13 RX ADMIN — Medication 25 MILLIGRAM(S): at 05:26

## 2023-07-13 RX ADMIN — TICAGRELOR 90 MILLIGRAM(S): 90 TABLET ORAL at 08:27

## 2023-07-13 RX ADMIN — Medication 81 MILLIGRAM(S): at 12:13

## 2023-07-13 NOTE — DISCHARGE NOTE PROVIDER - HOSPITAL COURSE
63 yo M with PMHx of CAD s/p MI in 1998 at [facility] (subsequent coronary angiogram showed 2vd, treated medically) HTN, HLD presents with acute onset chest pain. Pt reports he woke up at 3 am to pray, developed onset of mid sternal chest pain, radiated to mid back, not as severe as pain felt at time of MI. Pain self resolved after a few minutes, subsequently developed cold sweating and dizziness, then came to ED.    Of note, pt sees Dr. Medel, had a CCTA in 9/21 that showed moderate stenosis in mid LAD, LCx, possible severe stenosis in RCA. Was medically managed given pt was without symptoms.    In the ED, pt became hypotensive, 80s/60 and bradycardic, at the time endorsed dizziness and sweating and feels nervous. Symptoms resolved and BP came up, receiving 500 cc bolus on assessment. Patient had labwork showing trop 0.11, 0.14. EKG showed sinus bradcardia without signs of ischemia.    #NSTEMI  #CAD s/p MI in 1998  #HTN, HLD  - Trop 0.11 -> 0.14, trend trop  - EKG: sinus bk, repeat EKG in AM or PRN if patient has active CP  - patient on metoprolol 50 and 25 at home, withhold for now as he is bradycardic, possible RCA lesion  - withhold losartan for now as patient was hypotensive in the morning  - patient on atorva 20 mg, increase to 40 mg as tolerated (patient had muscle pain in the past but now tolerated statins)  - TTE ordered  - loaded with aspirin  - no heparin or plavix as per Cardio fellow  - Cath 07/12/23 with Dr. Medel: Intervention: Successful PCI of mid-RCA and distal -RCA.      Patient is medically stable for discharge        63 yo M with PMHx of CAD s/p MI in 1998 at [facility] (subsequent coronary angiogram showed 2vd, treated medically) HTN, HLD presents with acute onset chest pain. Pt reports he woke up at 3 am to pray, developed onset of mid sternal chest pain, radiated to mid back, not as severe as pain felt at time of MI. Pain self resolved after a few minutes, subsequently developed cold sweating and dizziness, then came to ED.    Of note, pt sees Dr. Medel, had a CCTA in 9/21 that showed moderate stenosis in mid LAD, LCx, possible severe stenosis in RCA. Was medically managed given pt was without symptoms.    In the ED, pt became hypotensive, 80s/60 and bradycardic, at the time endorsed dizziness and sweating and feels nervous. Symptoms resolved and BP came up, receiving 500 cc bolus on assessment. Patient had labwork showing trop 0.11, 0.14. EKG showed sinus bradcardia without signs of ischemia.    #NSTEMI  #CAD s/p MI in 1998  #HTN, HLD  - Cath 07/12/23 with Dr. Medel: Intervention: Successful PCI of mid-RCA and distal -RCA.      Patient is medically stable for discharge

## 2023-07-13 NOTE — DISCHARGE NOTE PROVIDER - CARE PROVIDER_API CALL
YOLANDA VASQUEZ  [address] [place] [address]  Phone: [phone]  Fax: [phone])-  Follow Up Time: 1 month   YOLANDA VASQUEZ W  [address] [place] [address]  Phone: ()-  Fax: ()-  Follow Up Time: 1 month    Sarwat Medel  Interventional Cardiology  [address] [place] [id number]  Phone: [phone]  Fax: [phone]  Follow Up Time: 1 week

## 2023-07-13 NOTE — DISCHARGE NOTE PROVIDER - NSDCPNSUBOBJ_GEN_ALL_CORE
Pt seen at bedside. States he feels well. Echo with mild-moderate regurg, echo normal. Stable for dc home.

## 2023-07-13 NOTE — DISCHARGE NOTE PROVIDER - NSDCMRMEDTOKEN_GEN_ALL_CORE_FT
aspirin 81 mg oral capsule: 1 orally once a day  Brilinta (ticagrelor) 90 mg oral tablet: 1 tab(s) orally 2 times a day MDD: 2  Centrum Jr oral tablet, chewable: 1 chewed once a day  Coenzyme Q10 10 mg oral capsule: 1 tab(s) orally once a day (at bedtime)  ezetimibe 10 mg oral tablet: 1 orally once a day (at bedtime)  folic acid 1 mg oral tablet: 1 orally once a day  Lipitor 20 mg oral tablet: 1 orally once a day (at bedtime)  losartan 50 mg oral tablet: 1 orally once a day  metoprolol tartrate 25 mg oral tablet: 1 orally once a day (at bedtime)  Metoprolol Tartrate 50 mg oral tablet: 1 orally once a day   aspirin 81 mg oral capsule: 1 orally once a day  atorvastatin 40 mg oral tablet: 1 tab(s) orally once a day (at bedtime)  Brilinta (ticagrelor) 90 mg oral tablet: 1 tab(s) orally 2 times a day MDD: 2  Centrum Jr oral tablet, chewable: 1 chewed once a day  Coenzyme Q10 10 mg oral capsule: 1 tab(s) orally once a day (at bedtime)  ezetimibe 10 mg oral tablet: 1 orally once a day (at bedtime)  folic acid 1 mg oral tablet: 1 orally once a day  losartan 50 mg oral tablet: 1 orally once a day  metoprolol tartrate 25 mg oral tablet: 1 orally once a day (at bedtime)  Metoprolol Tartrate 50 mg oral tablet: 1 orally once a day

## 2023-07-13 NOTE — PROGRESS NOTE ADULT - ASSESSMENT
63 yo M with PMHx of CAD s/p MI in 1998 s/p thrombolysis at [facility] (subsequent coronary angiogram showed 2vd, treated medically) HTN, HLD presenting with chest pain.     #NSTEMI  #CAD s/p MI in 1998  #HTN, HLD  - Trop 0.11 -> 0.14, trend trop  - EKG: sinus bk, repeat EKG in AM or PRN if patient has active CP  - patient on metoprolol 50 and 25 at home, withhold for now as he is bradycardic, possible RCA lesion  - withhold losartan for now as patient was hypotensive in the morning  - patient on atorva 20 mg, increase to 40 mg as tolerated (patient had muscle pain in the past but now tolerated statins)  - TTE ordered  - loaded with aspirin  - no heparin or plavix as per Cardio fellow  - Cath TODAY 07/12/23 with Dr. Medel  - DASH diet and NPO after midnight  - coags and type&cross    Diet: DASH  Activity: as tolerated  GI ppx: none  DVT ppx: lovenox (please order after cath)  Dispo: tele    PENDING: Cardiac cath. follow post procedure notes 
63 yo M with PMHx of CAD s/p MI in 1998 at [facility] (subsequent coronary angiogram showed 2vd, treated medically) HTN, HLD presents with acute onset chest pain. Pt reports he woke up at 3 am to pray, developed onset of mid sternal chest pain, radiated to mid back, not as severe as pain felt at time of MI. Pain self resolved after a few minutes, subsequently developed cold sweating and dizziness, then came to ED.    Of note, pt sees Dr. Medel, had a CCTA in 9/21 that showed moderate stenosis in mid LAD, LCx, possible severe stenosis in RCA. Was medically managed given pt was without symptoms.  In the ED, pt became hypotensive, 80s/60 and bradycardic, at the time endorsed dizziness and sweating and feels nervous. Symptoms resolved and BP came up, receiving 500 cc bolus on assessment. Patient had labwork showing trop 0.11, 0.14. EKG showed sinus bradcardia without signs of ischemia.    #NSTEMI  #CAD s/p MI in 1998  #HTN, HLD  - Trop 0.11 -> 0.14, trend trop  - EKG: sinus bk, repeat EKG in AM or PRN if patient has active CP  - patient on metoprolol 50 and 25 at home, withhold for now as he is bradycardic, possible RCA lesion  - withhold losartan for now as patient was hypotensive in the morning  - patient on atorva 20 mg, increase to 40 mg as tolerated (patient had muscle pain in the past but now tolerated statins)  - TTE ordered  - loaded with aspirin  - no heparin or plavix as per Cardio fellow  - Cath 07/12/23 with Dr. Medel--- Cath 07/12/23 with Dr. Medel: Intervention: Successful PCI of mid-RCA and distal RCA    Diet: DASH  Activity: as tolerated  GI ppx: none  DVT ppx: lovenox (please order after cath)  Dispo: tele

## 2023-07-13 NOTE — DISCHARGE NOTE PROVIDER - NSDCFUSCHEDAPPT_GEN_ALL_CORE_FT
Sarwat Medel  [facility]  CARDIOLOGY [phone]  Scheduled Appointment: 07/18/2023

## 2023-07-13 NOTE — DISCHARGE NOTE PROVIDER - PROVIDER TOKENS
PROVIDER:[TOKEN:[[id number]],FOLLOWUP:[1 month]] PROVIDER:[TOKEN:[[id number]],FOLLOWUP:[1 month]],PROVIDER:[TOKEN:[[id number]],FOLLOWUP:[1 week]]

## 2023-07-13 NOTE — DISCHARGE NOTE PROVIDER - ATTENDING DISCHARGE PHYSICAL EXAMINATION:
PHYSICAL EXAM:  GENERAL: NAD, speaks in full sentences, no signs of respiratory distress  HEAD:  Atraumatic, Normocephalic  EYES: conjunctiva and sclera clear  NECK: Supple, No JVD  CHEST/LUNG:  No wheeze; No crackles; No accessory muscles used  EXTREMITIES:  2+ Peripheral Pulses, No cyanosis or edema  PSYCH: AAOx3  NEUROLOGY: non-focal  SKIN: No rashes or lesions

## 2023-07-13 NOTE — DISCHARGE NOTE NURSING/CASE MANAGEMENT/SOCIAL WORK - NSDCPEFALRISK_GEN_ALL_CORE
For information on Fall & Injury Prevention, visit: [email or web address] OR  [email or web address] OR  https://www.cdc.gov/steadi/patient.html

## 2023-07-13 NOTE — PROGRESS NOTE ADULT - SUBJECTIVE AND OBJECTIVE BOX
24H events:    Patient is a 62y old Male who presents with a chief complaint of Chest Pain (13 Jul 2023 10:17)    Primary diagnosis of Non-ST elevation MI (NSTEMI)       Today is hospital day 2d.      Patient seen and examined at bedside. Reports no active complaints.     PAST MEDICAL & SURGICAL HISTORY  CAD (coronary artery disease)    Hyperlipidemia    Hypertension    No significant past surgical history      SOCIAL HISTORY:  Negative for smoking/alcohol/drug use.     ALLERGIES:  streptomycin (Unknown)    MEDICATIONS:  STANDING MEDICATIONS  aspirin  chewable 81 milliGRAM(s) Oral daily  atorvastatin 40 milliGRAM(s) Oral at bedtime  folic acid 1 milliGRAM(s) Oral daily  losartan 50 milliGRAM(s) Oral at bedtime  metoprolol succinate ER 25 milliGRAM(s) Oral daily  sodium chloride 0.9%. 1050 milliLiter(s) IV Continuous <Continuous>  sodium chloride 0.9%. 250 milliLiter(s) IV Continuous <Continuous>  ticagrelor 90 milliGRAM(s) Oral every 12 hours    PRN MEDICATIONS  acetaminophen     Tablet .. 650 milliGRAM(s) Oral every 6 hours PRN  melatonin 3 milliGRAM(s) Oral at bedtime PRN    VITALS:   T(F): 99.1  HR: 69  BP: 124/63  RR: 18  SpO2: 96%    LABS:                        14.0   10.96 )-----------( 181      ( 13 Jul 2023 07:37 )             42.3     07-13    141  |  106  |  14  ----------------------------<  115<H>  4.3   |  23  |  1.0    Ca    8.6      13 Jul 2023 07:37  Mg     2.2     07-13    TPro  6.1  /  Alb  4.1  /  TBili  1.4<H>  /  DBili  x   /  AST  65<H>  /  ALT  49<H>  /  AlkPhos  82  07-13    PT/INR - ( 12 Jul 2023 06:30 )   PT: 11.40 sec;   INR: 1.00 ratio         PTT - ( 12 Jul 2023 06:30 )  PTT:60.2 sec  Urinalysis Basic - ( 13 Jul 2023 07:37 )    Color: x / Appearance: x / SG: x / pH: x  Gluc: 115 mg/dL / Ketone: x  / Bili: x / Urobili: x   Blood: x / Protein: x / Nitrite: x   Leuk Esterase: x / RBC: x / WBC x   Sq Epi: x / Non Sq Epi: x / Bacteria: x            CARDIAC MARKERS ( 12 Jul 2023 06:45 )  x     / 0.78 ng/mL / x     / x     / x      CARDIAC MARKERS ( 11 Jul 2023 20:22 )  x     / 0.23 ng/mL / x     / x     / x      CARDIAC MARKERS ( 11 Jul 2023 17:05 )  x     / 0.19 ng/mL / x     / x     / x          RADIOLOGY:    PHYSICAL EXAM:              CONSTITUTIONAL: Well-developed; well-nourished; in no acute distress.   	SKIN: Warm, dry  	HEAD: Normocephalic; atraumatic  	EYES: PERRL, EOMI, normal sclera and conjunctiva. No pallor.  	ENT: No nasal discharge; airway clear. MMM  	NECK: Supple; non tender. Posterior neck erythematous spots, since birth per pt, nontender  	CARD:  Regular rate and rhythm. Normal S1, S2. No reproducible chest pain. 2+ radial pulses. Normal capillary refill. Trace LE edema b/l.  	RESP: No increased WOB. CTA b/l without wheezes, crackles, rhonchi. No CVA tenderness.   	ABD: Normoactive BS. Soft, nontender, nondistended.  	EXT: Normal ROM.   	NEURO: Alert, oriented, grossly unremarkable              PSYCH: Cooperative, appropriate.        
Cardiology Follow up s/p PCI JOIE    MERARY STEWART   62y Male  PAST MEDICAL & SURGICAL HISTORY:    CAD (coronary artery disease)    Hyperlipidemia    Hypertension    No significant past surgical history           HPI:  61 yo M with PMHx of CAD s/p MI in 1998 at [facility] (subsequent coronary angiogram showed 2vd, treated medically) HTN, HLD presents with acute onset chest pain. Pt reports he woke up at 3 am to pray, developed onset of mid sternal chest pain, radiated to mid back, not as severe as pain felt at time of MI. Pain self resolved after a few minutes, subsequently developed cold sweating and dizziness, then came to ED.    Of note, pt sees Dr. Medel, had a CCTA in 9/21 that showed moderate stenosis in mid LAD, LCx, possible severe stenosis in RCA. Was medically managed given pt was without symptoms.    In the ED, pt became hypotensive, 80s/60 and bradycardic, at the time endorsed dizziness and sweating and feels nervous. Symptoms resolved and BP came up, receiving 500 cc bolus on assessment. Patient had labwork showing trop 0.11, 0.14. EKG showed sinus bradcardia without signs of ischemia. (11 Jul 2023 15:10)    Allergies    streptomycin (Unknown)    Intolerances    Patient seen and examined at bedside. No acute events overnight.  Patient without complaints. Pt ambulated without issues/symptoms  Denies CP, SOB, palpitations, or dizziness  Bradycardia events noted on telemetry overnight    Vital Signs Last 24 Hrs  T(C): 37.3 (13 Jul 2023 04:53), Max: 38 (12 Jul 2023 21:57)  T(F): 99.1 (13 Jul 2023 04:53), Max: 100.4 (12 Jul 2023 21:57)  HR: 69 (13 Jul 2023 04:53) (54 - 69)  BP: 124/63 (13 Jul 2023 04:53) (101/59 - 124/63)  BP(mean): --  RR: 18 (13 Jul 2023 04:53) (16 - 18)  SpO2: 96% (12 Jul 2023 21:57) (96% - 96%)    Parameters below as of 12 Jul 2023 21:57  Patient On (Oxygen Delivery Method): room air    MEDICATIONS  (STANDING):  aspirin  chewable 81 milliGRAM(s) Oral daily  atorvastatin 40 milliGRAM(s) Oral at bedtime  folic acid 1 milliGRAM(s) Oral daily  losartan 50 milliGRAM(s) Oral at bedtime  metoprolol succinate ER 25 milliGRAM(s) Oral daily  sodium chloride 0.9%. 1050 milliLiter(s) (150 mL/Hr) IV Continuous <Continuous>  sodium chloride 0.9%. 250 milliLiter(s) (250 mL/Hr) IV Continuous <Continuous>  ticagrelor 90 milliGRAM(s) Oral every 12 hours    MEDICATIONS  (PRN):  acetaminophen     Tablet .. 650 milliGRAM(s) Oral every 6 hours PRN Temp greater or equal to 38C (100.4F), Mild Pain (1 - 3)  melatonin 3 milliGRAM(s) Oral at bedtime PRN Insomnia      REVIEW OF SYSTEMS:          All negative except as mentioned in HPI    PHYSICAL EXAM:           CONSTITUTIONAL: Well-developed; well-nourished; in no acute distress  	SKIN: warm, dry  	HEAD: Normocephalic; atraumatic  	EYES: PERRL.  	ENT: No nasal discharge, airway clear, mucous membranes moist  	NECK: Supple; non tender.  	CARD: +S1, +S2, no murmurs, gallops, or rubs. Regular rate and rhythm    	RESP: No wheezes, rales or rhonchi. CTA B/L  	ABD: soft ntnd, + BS x 4 quadrants  	EXT: moves all extremities,  no clubbing, cyanosis or edema  	NEURO: Alert and oriented x3, no focal deficits          PSYCH: Cooperative, appropriate          VASCULAR:  + Rad / + PTs / +  DPs          EXTREMITY:              Right Radial: pressure dressing removed, access site soft, no hematoma, no pain, + pulses, no sign of infection, no numbness          7/13/2023   ECG:   NSR hr 66 bpm  QT/QTc 370/387  no ischemic changes      ECG:   < from: 12 Lead ECG (07.12.23 @ 19:11) >    Ventricular Rate 67 BPM    Atrial Rate 67 BPM    P-R Interval 184 ms    QRS Duration 82 ms    Q-T Interval 354 ms    QTC Calculation(Bazett) 374 ms    P Axis 41 degrees    R Axis 1 degrees    T Axis 34 degrees    Diagnosis Line Normal sinus rhythm  Normal ECG    Confirmed by ZAYNAB GARCIA, JORGE ([id number]) on 7/12/2023 10:31:30 PM                                                                                    2D ECHO:  P      LABS:                        14.0   10.96 )-----------( 181      ( 13 Jul 2023 07:37 )             42.3     07-13    141  |  106  |  14  ----------------------------<  115<H>  4.3   |  23  |  1.0    Ca    8.6      13 Jul 2023 07:37  Mg     2.2     07-13    TPro  6.1  /  Alb  4.1  /  TBili  1.4<H>  /  DBili  x   /  AST  65<H>  /  ALT  49<H>  /  AlkPhos  82  07-13    CARDIAC MARKERS ( 12 Jul 2023 06:45 )  x     / 0.78 ng/mL / x     / x     / x      CARDIAC MARKERS ( 11 Jul 2023 20:22 )  x     / 0.23 ng/mL / x     / x     / x      CARDIAC MARKERS ( 11 Jul 2023 17:05 )  x     / 0.19 ng/mL / x     / x     / x      CARDIAC MARKERS ( 11 Jul 2023 10:16 )  x     / 0.14 ng/mL / x     / x     / x        Magnesium: 2.2 mg/dL [1.8 - 2.4] (07-13-23 @ 07:37)  LIVER FUNCTIONS - ( 13 Jul 2023 07:37 )  Alb: 4.1 g/dL / Pro: 6.1 g/dL / ALK PHOS: 82 U/L / ALT: 49 U/L / AST: 65 U/L / GGT: x             A/P:  I discussed the case with Cardiologist Dr. Medel and recommend the following:  S/P PCI:  Access & Closure:     [x] 6 Fr Radial Artery , Radial vasc band    IV Contrast: 150 mL      Intervention:   Successful PCI of mid-RCA and distal -RCA.    Implants:    Mid-RCA: Synergy 4.0mmx 20mm  Distal RCA: Synergy 2.5mmx 38mm.    FINDINGS:     Coronary Dominance:     LM: Average size. Minor irregularities.    LAD:   P-LAD : Mild disease.  Mid-LAD has 90 percent disease.  Distal LAD: Moderate to severe diffuse disease. Apical portion has severe disease    CX:   P-LCX has minor irregularities.  Mid-LCX: mild disease  Distal LCX: Mild disease.  Om1 has mild disease.    Ramus: Ostial Ramus has 80 percent disease, Proximal ramus has 90 percent disease.    RCA:   P-RCA has moderate disease 50percent, heavily calcified.   Mid-RCA has 95 percent stenosis s/p successful PCI with JOIE.  Distal RCA: 100 percent occluded s/p successful PCI with JOIE.     LVEDP: 21 mmHg     A1C with Estimated Average Glucose Result: 5.6  LDL Cholesterol Calculated: 77 mg/dL (07.12.23 @ 06:30)                       F/U 2D Echo results                   Increase Lipitor to 40 mg PO qHS                   Resume all home medication on D/C home   	     Continue DAPT ( Aspirin 81 mg PO Daily and Brilinta 90 mg PO q12hr ), ARB, B-Blocker, Statin Therapy                   Patient pharmacy called in for Brilinta prescription and it is $0 per month, patient is agreeing for it, medication ready for a                     Patient agreeing to take DAPT for at least one year or as directed by cardiologist                    Pt given instructions on importance of taking antiplatelet medication or risk acute stent thrombosis/death                   Post cath instructions, access site care and activity restrictions reviewed with patient                     Discussed with patient to return to hospital if experience chest pain, shortness breath, dizziness and site bleeding                   Aggressive risk factor modification, diet counseling, smoking cessation discussed with patient                       Can discharge patient from interventional cardiac standpoint after ambulating without symptoms and access site wnl, Echo results reviewed                    Benefits of Cardiac Rehab discussed with patient, All documents sent to [facility]. Patient instructed to call and make first                               appointment after first f/u visit with Cardiologist                    Follow up with Cardiology Dr. Medel in one week. Instructed to call and make an appointment                                          
24H events:    Patient is a 62y old Male who presents with a chief complaint of Chest Pain (11 Jul 2023 15:10)    Primary diagnosis of Non-ST elevation MI (NSTEMI)       Today is hospital day 1d.      Patient seen and examined at bedside. Reports no active complaints.     PAST MEDICAL & SURGICAL HISTORY  CAD (coronary artery disease)    Hyperlipidemia    Hypertension    No significant past surgical history      SOCIAL HISTORY:  Negative for smoking/alcohol/drug use.     ALLERGIES:  streptomycin (Unknown)    MEDICATIONS:  STANDING MEDICATIONS  aspirin  chewable 81 milliGRAM(s) Oral daily  atorvastatin 40 milliGRAM(s) Oral at bedtime  folic acid 1 milliGRAM(s) Oral daily  sodium chloride 0.9%. 250 milliLiter(s) IV Continuous <Continuous>  sodium chloride 0.9%. 1050 milliLiter(s) IV Continuous <Continuous>  ticagrelor 90 milliGRAM(s) Oral every 12 hours    PRN MEDICATIONS  acetaminophen     Tablet .. 650 milliGRAM(s) Oral every 6 hours PRN  melatonin 3 milliGRAM(s) Oral at bedtime PRN    VITALS:   T(F): 98.8  HR: 54  BP: 101/59  RR: 16  SpO2: 98%    LABS:                        14.7   14.00 )-----------( 224      ( 12 Jul 2023 06:45 )             43.8     07-12    143  |  107  |  15  ----------------------------<  122<H>  4.5   |  22  |  1.1    Ca    8.9      12 Jul 2023 06:30  Mg     2.2     07-12    TPro  6.0  /  Alb  4.1  /  TBili  0.8  /  DBili  x   /  AST  64<H>  /  ALT  42<H>  /  AlkPhos  80  07-12    PT/INR - ( 12 Jul 2023 06:30 )   PT: 11.40 sec;   INR: 1.00 ratio         PTT - ( 12 Jul 2023 06:30 )  PTT:60.2 sec  Urinalysis Basic - ( 12 Jul 2023 06:30 )    Color: x / Appearance: x / SG: x / pH: x  Gluc: 122 mg/dL / Ketone: x  / Bili: x / Urobili: x   Blood: x / Protein: x / Nitrite: x   Leuk Esterase: x / RBC: x / WBC x   Sq Epi: x / Non Sq Epi: x / Bacteria: x        Troponin T, Serum: 0.78 ng/mL *HH* (07-12-23 @ 06:45)  Troponin T, Serum: 0.23 ng/mL *HH* (07-11-23 @ 20:22)  Troponin T, Serum: 0.19 ng/mL *HH* (07-11-23 @ 17:05)      CARDIAC MARKERS ( 12 Jul 2023 06:45 )  x     / 0.78 ng/mL / x     / x     / x      CARDIAC MARKERS ( 11 Jul 2023 20:22 )  x     / 0.23 ng/mL / x     / x     / x      CARDIAC MARKERS ( 11 Jul 2023 17:05 )  x     / 0.19 ng/mL / x     / x     / x      CARDIAC MARKERS ( 11 Jul 2023 10:16 )  x     / 0.14 ng/mL / x     / x     / x      CARDIAC MARKERS ( 11 Jul 2023 07:35 )  x     / 0.11 ng/mL / x     / x     / x          RADIOLOGY:    PHYSICAL EXAM:              CONSTITUTIONAL: Well-developed; well-nourished; in no acute distress.   	SKIN: Warm, dry  	HEAD: Normocephalic; atraumatic  	EYES: PERRL, EOMI, normal sclera and conjunctiva. No pallor.  	ENT: No nasal discharge; airway clear. MMM  	NECK: Supple; non tender. Posterior neck erythematous spots, since birth per pt, nontender  	CARD:  Regular rate and rhythm. Normal S1, S2. No reproducible chest pain. 2+ radial pulses. Normal capillary refill. Trace LE edema b/l.  	RESP: No increased WOB. CTA b/l without wheezes, crackles, rhonchi. No CVA tenderness.   	ABD: Normoactive BS. Soft, nontender, nondistended.  	EXT: Normal ROM.   	NEURO: Alert, oriented, grossly unremarkable              PSYCH: Cooperative, appropriate.

## 2023-07-13 NOTE — DISCHARGE NOTE NURSING/CASE MANAGEMENT/SOCIAL WORK - PATIENT PORTAL LINK FT
You can access the FollowMyHealth Patient Portal offered by [facility] by registering at the following website: [email or web address]. By joining [facility]’s FollowMyHealth portal, you will also be able to view your health information using other applications (apps) compatible with our system.

## 2023-07-13 NOTE — DISCHARGE NOTE PROVIDER - NSDCCPCAREPLAN_GEN_ALL_CORE_FT
PRINCIPAL DISCHARGE DIAGNOSIS  Diagnosis: Non-ST elevation MI (NSTEMI)  Assessment and Plan of Treatment: you were admitted and managed for heart attack.  .      SECONDARY DISCHARGE DIAGNOSES  Diagnosis: Atypical chest pain  Assessment and Plan of Treatment:      PRINCIPAL DISCHARGE DIAGNOSIS  Diagnosis: Non-ST elevation MI (NSTEMI)  Assessment and Plan of Treatment: you were admitted and managed for heart attack. A heart attack occurs when an artery that sends blood and oxygen to the heart is blocked. Fatty, cholesterol-containing deposits build up over time, forming plaques in the heart's arteries. If a plaque ruptures, a blood clot can form. The clot can block arteries, causing a heart attack. During a heart attack, a lack of blood flow causes the tissue in the heart muscle to die.  A heart attack is also called a myocardial infarction.  Prompt treatment is needed for a heart attack to prevent death. Call 911 or emergency medical help if you think you might be having a heart attack.  .      SECONDARY DISCHARGE DIAGNOSES  Diagnosis: Atypical chest pain  Assessment and Plan of Treatment:

## 2023-07-18 ENCOUNTER — APPOINTMENT (OUTPATIENT)
Dept: CARDIOLOGY | Facility: CLINIC | Age: 63
End: 2023-07-18
Payer: MEDICAID

## 2023-07-18 VITALS
SYSTOLIC BLOOD PRESSURE: 105 MMHG | WEIGHT: 224 LBS | HEIGHT: 70 IN | BODY MASS INDEX: 32.07 KG/M2 | TEMPERATURE: 97 F | HEART RATE: 54 BPM | RESPIRATION RATE: 16 BRPM | OXYGEN SATURATION: 98 % | DIASTOLIC BLOOD PRESSURE: 65 MMHG

## 2023-07-18 DIAGNOSIS — R07.9 CHEST PAIN, UNSPECIFIED: ICD-10-CM

## 2023-07-18 PROBLEM — E78.5 HYPERLIPIDEMIA, UNSPECIFIED: Chronic | Status: ACTIVE | Noted: 2023-07-11

## 2023-07-18 PROBLEM — I10 ESSENTIAL (PRIMARY) HYPERTENSION: Chronic | Status: ACTIVE | Noted: 2023-07-11

## 2023-07-18 PROBLEM — I25.10 ATHEROSCLEROTIC HEART DISEASE OF NATIVE CORONARY ARTERY WITHOUT ANGINA PECTORIS: Chronic | Status: ACTIVE | Noted: 2023-07-11

## 2023-07-18 PROCEDURE — 99214 OFFICE O/P EST MOD 30 MIN: CPT | Mod: 25

## 2023-07-18 PROCEDURE — 93000 ELECTROCARDIOGRAM COMPLETE: CPT

## 2023-07-18 RX ORDER — ATORVASTATIN CALCIUM 20 MG/1
20 TABLET, FILM COATED ORAL
Qty: 90 | Refills: 3 | Status: DISCONTINUED | COMMUNITY
Start: 2021-09-09 | End: 2023-07-18

## 2023-07-18 RX ORDER — TICAGRELOR 90 MG/1
90 TABLET ORAL
Qty: 180 | Refills: 3 | Status: ACTIVE | COMMUNITY
Start: 1900-01-01 | End: 1900-01-01

## 2023-07-18 NOTE — HISTORY OF PRESENT ILLNESS
[FreeTextEntry1] : 63 yo male with pmhx as below is here for af/up visit\par S/p IPMI 1998, successfully trombolized, cath at [place] showed mod 2v cad, has been medically mx since then\par Last stress MPI 2/14 - low risk, stress echo 4/17 and 1/19 - no ischemia\par s/p ccta - extensive mod cad with possible severe lesion on rca\par 7/23 admitted to [facility] with ami, s/p cath/ pci of rca with abdulkadir\par residual lad and ri ds\par lost few lbs\par no c/o sob, nagel, pnd, orthopnea, no peripheral edema. No palpitations or exertional symptoms, no syncope or dizziness.\par et is stable

## 2023-07-18 NOTE — ASSESSMENT
[FreeTextEntry1] : # Coronary arteriosclerosis in native artery  (I25.10):\par # Mixed hypercholesterolemia and hypertriglyceridemia  (E78.2):\par # Overweight  (E66.9):\par \par 61 yo male with pmhx and presentation as above\par cad/cp\par stable BP/dyslipidemia\par Arthritis/obesity\par \par Cont present care\par all hx records reviewed\par will schedule for a staged of lad and ri in 4-6 weeks\par Weight reduction strongly encouraged, daily exercises and low caloric intake discussed\par Cont with aggressive risk modif\par Diet and act as above\par Increase CoQ 10, ample hydration\par RTC 6-8 weeks

## 2023-09-07 VITALS
DIASTOLIC BLOOD PRESSURE: 83 MMHG | SYSTOLIC BLOOD PRESSURE: 175 MMHG | HEART RATE: 74 BPM | RESPIRATION RATE: 19 BRPM | OXYGEN SATURATION: 100 %

## 2023-09-07 NOTE — H&P CARDIOLOGY - NSICDXPASTMEDICALHX_GEN_ALL_CORE_FT
PAST MEDICAL HISTORY:  CAD (coronary artery disease)     Hyperlipidemia     Hypertension     NSTEMI (non-ST elevation myocardial infarction)

## 2023-09-07 NOTE — H&P CARDIOLOGY - HISTORY OF PRESENT ILLNESS
61 yo M with PMHx of HTN, HLD, CAD s/p MI in 1998 at [facility] (subsequent coronary angiogram showed 2vd, treated medically), recent NSTEMI --> s/p PCI/JOIE of m/d RCA on 7/12/23, with residual LAD and Ramus disease (see below), who now presents for recommended staged PCI.  Since his procedure, pt has been feeling..............  Pt initially presented with acute onset chest pain, awakening him from sleep, radiated to mid back, with cold sweats and dizziness.  Pt had a CCTA in 9/21 that showed moderate stenosis in mid LAD, LCx, possible severe stenosis in RCA.     CATH 7/12/23 @ [facility]:  Intervention:   Successful PCI of mid-RCA and distal -RCA.    Implants:    Mid-RCA: Synergy 4.0mmx 20mm  Distal RCA: Synergy 2.5mmx 38mm.    FINDINGS:     Coronary Dominance:     LM: Average size. Minor irregularities.    LAD:   P-LAD : Mild disease.  Mid-LAD has 90 percent disease.  Distal LAD: Moderate to severe diffuse disease. Apical portion has severe disease    CX:   P-LCX has minor irregularities.  Mid-LCX: mild disease  Distal LCX: Mild disease.  Om1 has mild disease.    Ramus: Ostial Ramus has 80 percent disease, Proximal ramus has 90 percent disease.    RCA:   P-RCA has moderate disease 50percent, heavily calcified.   Mid-RCA has 95 percent stenosis s/p successful PCI with JOIE.  Distal RCA: 100 percent occluded s/p successful PCI with JOIE.     LVEDP: 21 mmHg       ECHO 7/13/23  Summary:   1. Normal global left ventricular systolic function.   2. LV Ejection Fraction by Hernandez's Method with a biplane EF of 67 %.   3. Spectral Doppler shows impaired relaxation pattern of left   ventricular myocardial filling (Grade I diastolic dysfunction).   4. Normal left atrial size.   5. Normal right atrial size.   6. Mild to moderate mitral valve regurgitation.   7. Mild tricuspid regurgitation.   8. Endocardial visualization was enhanced with intravenous echo contrast.      Pre cath note:    indication:  [ ] STEMI                [ ] NSTEMI                 [ ] Acute coronary syndrome                     [ ]Unstable Angina   [ ] high risk  [ ] intermediate risk  [ ] low risk                     [x ] Stable Angina     non-invasive testing:   CCTA            Date:  9/21                   result: [ ] high risk  [x ] intermediate risk  [ ] low risk    Anti- Anginal medications:                    [ ] not used                       [x ] used                   [ ] not used but strong indication not to use  -on BB    Ejection Fraction                   [ ] <29            [ ] 30-39%   [ ] 40-49%     [x ]>50%    CHF                   [ ] active (within last 14 days on meds   [ ] Chronic (on meds but no exacerbation)    COPD                   [ ] mild (on chronic bronchodilators)  [ ] moderate (on chronic steroid therapy)      [ ] severe (indication for home O2 or PACO2 >50)    Other risk factors:                       [x ] Previous MI                     [ ] CVA/ stroke                    [ ] carotid stent/ CEA                    [ ] PVD/PAD- (arterial aneurysm, non-palpable pulses, tortuous vessel with inability to insert catheter, infra-renal dissection, renal or subclavian artery stenosis)                    [ ] diabetic                    [ ] previous CABG                    [ ] Renal Failure     Right Kyle Test:    Adjusted Cath Bleeding Risk: 0.6%    Pre-hydration:       63 yo M with PMHx of HTN, HLD, CAD s/p MI in  at [facility] (subsequent coronary angiogram showed 2vd, treated medically), recent NSTEMI --> s/p PCI/JOIE of m/d RCA on 23, with residual LAD and Ramus disease (see below), who now presents for recommended staged PCI.  Since his procedure, pt has been feeling much better, able to do some walking exercise daily, no more chest pain.   Pt initially presented with acute onset chest pain, awakening him from sleep, radiated to mid back, with cold sweats and dizziness.  Pt had a CCTA in  that showed moderate stenosis in mid LAD, LCx, possible severe stenosis in RCA. Had cardiac catheterization 23 that resulted in PCI x2 to Mid RCA and Distal RCA and now presents to cardiology department today for [facility] for staged PCI to LAd    CATH 23 @ [facility]:  Intervention:   Successful PCI of mid-RCA and distal -RCA.    Implants:    Mid-RCA: Synergy 4.0mmx 20mm  Distal RCA: Synergy 2.5mmx 38mm.    FINDINGS:     Coronary Dominance:     LM: Average size. Minor irregularities.    LAD:   P-LAD : Mild disease.  Mid-LAD has 90 percent disease.  Distal LAD: Moderate to severe diffuse disease. Apical portion has severe disease    CX:   P-LCX has minor irregularities.  Mid-LCX: mild disease  Distal LCX: Mild disease.  Om1 has mild disease.    Ramus: Ostial Ramus has 80 percent disease, Proximal ramus has 90 percent disease.    RCA:   P-RCA has moderate disease 50percent, heavily calcified.   Mid-RCA has 95 percent stenosis s/p successful PCI with JOIE.  Distal RCA: 100 percent occluded s/p successful PCI with JOIE.     LVEDP: 21 mmHg       ECHO 23  Summary:   1. Normal global left ventricular systolic function.   2. LV Ejection Fraction by Hernandez's Method with a biplane EF of 67 %.   3. Spectral Doppler shows impaired relaxation pattern of left   ventricular myocardial filling (Grade I diastolic dysfunction).   4. Normal left atrial size.   5. Normal right atrial size.   6. Mild to moderate mitral valve regurgitation.   7. Mild tricuspid regurgitation.   8. Endocardial visualization was enhanced with intravenous echo contrast.      Pre cath note:    indication:  [ ] STEMI                [ ] NSTEMI                 [ ] Acute coronary syndrome                     [ ]Unstable Angina   [ ] high risk  [ ] intermediate risk  [ ] low risk                     [x ] Stable Angina     non-invasive testing:   CCTA            Date:                     result: [ ] high risk  [x ] intermediate risk  [ ] low risk    Anti- Anginal medications:                    [ ] not used                       [x ] used                   [ ] not used but strong indication not to use  -on BB  -ccb    Ejection Fraction                   [ ] <29            [ ] 30-39%   [ ] 40-49%     [x ]>50%    CHF                   [ ] active (within last 14 days on meds   [ ] Chronic (on meds but no exacerbation)    COPD                   [ ] mild (on chronic bronchodilators)  [ ] moderate (on chronic steroid therapy)      [ ] severe (indication for home O2 or PACO2 >50)    Other risk factors:                       [x ] Previous MI                     [ ] CVA/ stroke                    [ ] carotid stent/ CEA                    [ ] PVD/PAD- (arterial aneurysm, non-palpable pulses, tortuous vessel with inability to insert catheter, infra-renal dissection, renal or subclavian artery stenosis)                    [ ] diabetic                    [ ] previous CABG                    [ ] Renal Failure     Right Kyle Test: positive     Adjusted Cath Bleeding Risk: 0.6%    Pre-hydration: 250cc NS bolus x 1 for pre cath hydration     EK23:

## 2023-09-07 NOTE — H&P CARDIOLOGY - NSICDXFAMILYHX_GEN_ALL_CORE_FT
FAMILY HISTORY:  No pertinent family history in first degree relatives     FAMILY HISTORY:  Father  Still living? Unknown  FH: myocardial infarction, Age at diagnosis: Age Unknown    Mother  Still living? Unknown  FH: myocardial infarction, Age at diagnosis: Age Unknown    Uncle  Still living? Unknown  FH: myocardial infarction, Age at diagnosis: Age Unknown

## 2023-09-08 RX ORDER — MULTIVIT-MIN/FERROUS GLUCONATE 9 MG/15 ML
1 LIQUID (ML) ORAL
Refills: 0 | DISCHARGE

## 2023-09-11 ENCOUNTER — OUTPATIENT (OUTPATIENT)
Dept: OUTPATIENT SERVICES | Facility: HOSPITAL | Age: 63
LOS: 1 days | Discharge: HOME | End: 2023-09-11
Payer: MEDICAID

## 2023-09-11 DIAGNOSIS — Z98.890 OTHER SPECIFIED POSTPROCEDURAL STATES: Chronic | ICD-10-CM

## 2023-09-11 DIAGNOSIS — I25.10 ATHEROSCLEROTIC HEART DISEASE OF NATIVE CORONARY ARTERY WITHOUT ANGINA PECTORIS: ICD-10-CM

## 2023-09-11 DIAGNOSIS — R07.9 CHEST PAIN, UNSPECIFIED: ICD-10-CM

## 2023-09-11 LAB
ANION GAP SERPL CALC-SCNC: 16 MMOL/L — HIGH (ref 7–14)
BUN SERPL-MCNC: 13 MG/DL — SIGNIFICANT CHANGE UP (ref 10–20)
CALCIUM SERPL-MCNC: 9.3 MG/DL — SIGNIFICANT CHANGE UP (ref 8.4–10.5)
CHLORIDE SERPL-SCNC: 102 MMOL/L — SIGNIFICANT CHANGE UP (ref 98–110)
CO2 SERPL-SCNC: 23 MMOL/L — SIGNIFICANT CHANGE UP (ref 17–32)
CREAT SERPL-MCNC: 1 MG/DL — SIGNIFICANT CHANGE UP (ref 0.7–1.5)
EGFR: 86 ML/MIN/1.73M2 — SIGNIFICANT CHANGE UP
GLUCOSE SERPL-MCNC: 115 MG/DL — HIGH (ref 70–99)
HCT VFR BLD CALC: 41.2 % — LOW (ref 42–52)
HCT VFR BLD CALC: 49.2 % — SIGNIFICANT CHANGE UP (ref 42–52)
HGB BLD-MCNC: 14 G/DL — SIGNIFICANT CHANGE UP (ref 14–18)
HGB BLD-MCNC: 16.4 G/DL — SIGNIFICANT CHANGE UP (ref 14–18)
MCHC RBC-ENTMCNC: 29.2 PG — SIGNIFICANT CHANGE UP (ref 27–31)
MCHC RBC-ENTMCNC: 29.5 PG — SIGNIFICANT CHANGE UP (ref 27–31)
MCHC RBC-ENTMCNC: 33.3 G/DL — SIGNIFICANT CHANGE UP (ref 32–37)
MCHC RBC-ENTMCNC: 34 G/DL — SIGNIFICANT CHANGE UP (ref 32–37)
MCV RBC AUTO: 86.7 FL — SIGNIFICANT CHANGE UP (ref 80–94)
MCV RBC AUTO: 87.5 FL — SIGNIFICANT CHANGE UP (ref 80–94)
NRBC # BLD: 0 /100 WBCS — SIGNIFICANT CHANGE UP (ref 0–0)
NRBC # BLD: 0 /100 WBCS — SIGNIFICANT CHANGE UP (ref 0–0)
PLATELET # BLD AUTO: 205 K/UL — SIGNIFICANT CHANGE UP (ref 130–400)
PLATELET # BLD AUTO: 253 K/UL — SIGNIFICANT CHANGE UP (ref 130–400)
PMV BLD: 8.6 FL — SIGNIFICANT CHANGE UP (ref 7.4–10.4)
PMV BLD: 8.6 FL — SIGNIFICANT CHANGE UP (ref 7.4–10.4)
POTASSIUM SERPL-MCNC: 4.6 MMOL/L — SIGNIFICANT CHANGE UP (ref 3.5–5)
POTASSIUM SERPL-SCNC: 4.6 MMOL/L — SIGNIFICANT CHANGE UP (ref 3.5–5)
RBC # BLD: 4.75 M/UL — SIGNIFICANT CHANGE UP (ref 4.7–6.1)
RBC # BLD: 5.62 M/UL — SIGNIFICANT CHANGE UP (ref 4.7–6.1)
RBC # FLD: 12.4 % — SIGNIFICANT CHANGE UP (ref 11.5–14.5)
RBC # FLD: 12.4 % — SIGNIFICANT CHANGE UP (ref 11.5–14.5)
SODIUM SERPL-SCNC: 141 MMOL/L — SIGNIFICANT CHANGE UP (ref 135–146)
WBC # BLD: 8.41 K/UL — SIGNIFICANT CHANGE UP (ref 4.8–10.8)
WBC # BLD: 8.46 K/UL — SIGNIFICANT CHANGE UP (ref 4.8–10.8)
WBC # FLD AUTO: 8.41 K/UL — SIGNIFICANT CHANGE UP (ref 4.8–10.8)
WBC # FLD AUTO: 8.46 K/UL — SIGNIFICANT CHANGE UP (ref 4.8–10.8)

## 2023-09-11 PROCEDURE — 92929: CPT | Mod: LC

## 2023-09-11 PROCEDURE — 93458 L HRT ARTERY/VENTRICLE ANGIO: CPT | Mod: 59

## 2023-09-11 PROCEDURE — 36415 COLL VENOUS BLD VENIPUNCTURE: CPT

## 2023-09-11 PROCEDURE — C1894: CPT

## 2023-09-11 PROCEDURE — C9600: CPT | Mod: RI

## 2023-09-11 PROCEDURE — 93005 ELECTROCARDIOGRAM TRACING: CPT

## 2023-09-11 PROCEDURE — 93458 L HRT ARTERY/VENTRICLE ANGIO: CPT | Mod: 26,XU

## 2023-09-11 PROCEDURE — 93010 ELECTROCARDIOGRAM REPORT: CPT

## 2023-09-11 PROCEDURE — 92928 PRQ TCAT PLMT NTRAC ST 1 LES: CPT | Mod: LD

## 2023-09-11 PROCEDURE — C1769: CPT

## 2023-09-11 PROCEDURE — 85027 COMPLETE CBC AUTOMATED: CPT

## 2023-09-11 PROCEDURE — C1874: CPT

## 2023-09-11 PROCEDURE — 93010 ELECTROCARDIOGRAM REPORT: CPT | Mod: 77

## 2023-09-11 PROCEDURE — 80048 BASIC METABOLIC PNL TOTAL CA: CPT

## 2023-09-11 PROCEDURE — C1725: CPT

## 2023-09-11 PROCEDURE — C1887: CPT

## 2023-09-11 RX ORDER — EZETIMIBE 10 MG/1
1 TABLET ORAL
Refills: 0 | DISCHARGE

## 2023-09-11 RX ORDER — METOPROLOL TARTRATE 50 MG
1 TABLET ORAL
Refills: 0 | DISCHARGE

## 2023-09-11 RX ORDER — FOLIC ACID 0.8 MG
1 TABLET ORAL
Refills: 0 | DISCHARGE

## 2023-09-11 RX ORDER — PANTOPRAZOLE SODIUM 20 MG/1
1 TABLET, DELAYED RELEASE ORAL
Qty: 30 | Refills: 0
Start: 2023-09-11 | End: 2023-10-10

## 2023-09-11 RX ORDER — LOSARTAN POTASSIUM 100 MG/1
1 TABLET, FILM COATED ORAL
Refills: 0 | DISCHARGE

## 2023-09-11 RX ORDER — ASPIRIN/CALCIUM CARB/MAGNESIUM 324 MG
1 TABLET ORAL
Refills: 0 | DISCHARGE

## 2023-09-11 RX ORDER — UBIDECARENONE 100 MG
1 CAPSULE ORAL
Refills: 0 | DISCHARGE

## 2023-09-11 RX ORDER — TICAGRELOR 90 MG/1
1 TABLET ORAL
Qty: 60 | Refills: 3
Start: 2023-09-11 | End: 2024-01-08

## 2023-09-11 RX ORDER — ASPIRIN/CALCIUM CARB/MAGNESIUM 324 MG
1 TABLET ORAL
Qty: 30 | Refills: 3
Start: 2023-09-11 | End: 2024-01-08

## 2023-09-11 NOTE — CHART NOTE - NSCHARTNOTEFT_GEN_A_CORE
PRE-OP DIAGNOSIS:  CAD s/p PCI of RCA returning for staged procedure     PROCEDURE:   [x ] Coronary Angiogram   [x ] LHC   [ ] LVG   [ ] RHC   [x ] Intervention (see below)       PHYSICIAN:  Dr. Medel  INTERVENTIONAL FELLOW: Dr. Carson  FELLOW: Riaz    PROCEDURE DESCRIPTION:     Consent:    [x] Patient   [] Family Member   []  Used      Anesthesia:   [ ] General   [X] Sedation   [X] Local     Access & Closure:   [x ] 6  Fr R      Radial Artery  -> D-stat     [ ]   Fr R    L    Femoral Artery -> Manual compression    Perclose    Angioseal  [ ]   Fr R    L   Femoral Vein -> Manual compression  [ ]   Fr R    L    Brachial Vein -> Manual compression    IV Contrast: 130 mL      Intervention:  Successful PCI of ramus and distal LAD with balloon angioplasty s/p JOIE x 2    Implants: Synergy XD 2.25 mm x 20 mm  to ramus, Synergy XD 3.00 x 12 mm to distal LAD                  AUC: 7     FINDINGS:   Coronary Dominance: right     LM: minimal irregularities    LAD:   prox: mild disease  mid: moderate disease 60% stenosis   distal: severe disease with 90% stenosis s/p successful PCI     CX:   prox: moderate disease with 50% stenosis   distal: mild disease    Ramus: severe disease proximally with 90% stenosis s/p successful PCI     RCA: prox: moderate disease with 50% stenosis   patent stents in mid and distal RCA      LVEDP: 13 mmHg       ESTIMATED BLOOD LOSS: < 10 mL      CONDITION:   [x] Good   [ ] Fair   [ ] Critical     SPECIMEN REMOVED: N/A     POST-OP DIAGNOSIS:    [ ] Normal Coronary Angiogram   [ ] Mild Coronary Artery Disease (< 50% stenosis)   x[ ] significant  2- Vessel Coronary Artery Disease s/p successful PCI of ramus and distal LAD and patent stents in mid and distal RCA     PLAN OF CARE:   [x ] D/C Home Today   [ ] Return to In-patient bed   [ ] Admit for observation   [ ] Return for Staged Procedure in 4-6 weeks   [ ] CT Surgery Consult   [X] Medications: ASA, brillinta statin  [X] IV Fluids: NS @ 200cc/h x 6 hours  [ ] EP Consult  [x] Remove D-stat     [x] Smoking cessation PRE-OP DIAGNOSIS:  CAD s/p PCI of RCA returning for staged procedure     PROCEDURE:   [x ] Coronary Angiogram   [x ] LHC   [ ] LVG   [ ] RHC   [x ] Intervention (see below)       PHYSICIAN:  Dr. Medel  INTERVENTIONAL FELLOW: Dr. Carson  FELLOW: Riaz    PROCEDURE DESCRIPTION:     Consent:    [x] Patient   [] Family Member   []  Used      Anesthesia:   [ ] General   [X] Sedation   [X] Local     Access & Closure:   [x ] 6  Fr R      Radial Artery  -> D-stat     [ ]   Fr R    L    Femoral Artery -> Manual compression    Perclose    Angioseal  [ ]   Fr R    L   Femoral Vein -> Manual compression  [ ]   Fr R    L    Brachial Vein -> Manual compression    IV Contrast: 130 mL      Intervention:  Successful PCI of ramus and distal LAD with balloon angioplasty s/p JOIE x 2    Implants: Synergy XD 2.25 mm x 20 mm  to ramus, Synergy XD 3.00 x 12 mm to distal LAD                  AUC: 7     FINDINGS:   Coronary Dominance: right     LM: minimal irregularities    LAD:   prox: mild disease  mid: moderate disease 60% stenosis   distal: severe disease with 90% stenosis s/p successful PCI     CX:   prox: moderate disease with 50% stenosis   distal: mild disease    Ramus: severe disease proximally with 90% stenosis s/p successful PCI     RCA: prox: moderate disease with 50% stenosis   patent stents in mid and distal RCA      LVEDP: 13 mmHg       ESTIMATED BLOOD LOSS: < 10 mL      CONDITION:   [x] Good   [ ] Fair   [ ] Critical     SPECIMEN REMOVED: N/A     POST-OP DIAGNOSIS:    [ ] Normal Coronary Angiogram   [ ] Mild Coronary Artery Disease (< 50% stenosis)   x[ ] significant  2- Vessel Coronary Artery Disease s/p successful PCI of ramus and distal LAD and patent stents in mid and distal RCA     PLAN OF CARE:   [x ] D/C Home Today   [ ] Return to In-patient bed   [ ] Admit for observation   [ ] Return for Staged Procedure in 4-6 weeks   [X] Medications: ASA, brillinta statin  [X] IV Fluids: NS @ 200cc/h x 6 hours  [ ] EP Consult  [x] Remove D-stat     [x] Smoking cessation

## 2023-09-11 NOTE — PROGRESS NOTE ADULT - SUBJECTIVE AND OBJECTIVE BOX
Cardiology Follow up s/p PCI JOIE     MERARY STEWART   62y Male  PAST MEDICAL & SURGICAL HISTORY:  CAD (coronary artery disease)  Hyperlipidemia  Hypertension  NSTEMI (non-ST elevation myocardial infarction)  History of percutaneous angioplasty      HPI:    63 yo M with PMHx of HTN, HLD, CAD s/p MI in  at [facility] (subsequent coronary angiogram showed 2vd, treated medically), recent NSTEMI --> s/p PCI/JOIE of m/d RCA on 23, with residual LAD and Ramus disease (see below), who now presents for recommended staged PCI.  Since his procedure, pt has been feeling much better, able to do some walking exercise daily, no more chest pain.   Pt initially presented with acute onset chest pain, awakening him from sleep, radiated to mid back, with cold sweats and dizziness.  Pt had a CCTA in  that showed moderate stenosis in mid LAD, LCx, possible severe stenosis in RCA. Had cardiac catheterization 23 that resulted in PCI x2 to Mid RCA and Distal RCA and now presents to cardiology department today for [facility] for staged PCI to LAd    CATH 23 @ [facility]:  Intervention:   Successful PCI of mid-RCA and distal -RCA.    Implants:    Mid-RCA: Synergy 4.0mmx 20mm  Distal RCA: Synergy 2.5mmx 38mm.    FINDINGS:     Coronary Dominance:     LM: Average size. Minor irregularities.    LAD:   P-LAD : Mild disease.  Mid-LAD has 90 percent disease.  Distal LAD: Moderate to severe diffuse disease. Apical portion has severe disease    CX:   P-LCX has minor irregularities.  Mid-LCX: mild disease  Distal LCX: Mild disease.  Om1 has mild disease.    Ramus: Ostial Ramus has 80 percent disease, Proximal ramus has 90 percent disease.    RCA:   P-RCA has moderate disease 50percent, heavily calcified.   Mid-RCA has 95 percent stenosis s/p successful PCI with JOIE.  Distal RCA: 100 percent occluded s/p successful PCI with JOIE.     LVEDP: 21 mmHg       ECHO 23  Summary:   1. Normal global left ventricular systolic function.   2. LV Ejection Fraction by Hernandez's Method with a biplane EF of 67 %.   3. Spectral Doppler shows impaired relaxation pattern of left   ventricular myocardial filling (Grade I diastolic dysfunction).   4. Normal left atrial size.   5. Normal right atrial size.   6. Mild to moderate mitral valve regurgitation.   7. Mild tricuspid regurgitation.   8. Endocardial visualization was enhanced with intravenous echo contrast.      Pre cath note:    indication:  [ ] STEMI                [ ] NSTEMI                 [ ] Acute coronary syndrome                     [ ]Unstable Angina   [ ] high risk  [ ] intermediate risk  [ ] low risk                     [x ] Stable Angina     non-invasive testing:   CCTA            Date:                     result: [ ] high risk  [x ] intermediate risk  [ ] low risk    Anti- Anginal medications:                    [ ] not used                       [x ] used                   [ ] not used but strong indication not to use  -on BB  -ccb    Ejection Fraction                   [ ] <29            [ ] 30-39%   [ ] 40-49%     [x ]>50%    CHF                   [ ] active (within last 14 days on meds   [ ] Chronic (on meds but no exacerbation)    COPD                   [ ] mild (on chronic bronchodilators)  [ ] moderate (on chronic steroid therapy)      [ ] severe (indication for home O2 or PACO2 >50)    Other risk factors:                       [x ] Previous MI                     [ ] CVA/ stroke                    [ ] carotid stent/ CEA                    [ ] PVD/PAD- (arterial aneurysm, non-palpable pulses, tortuous vessel with inability to insert catheter, infra-renal dissection, renal or subclavian artery stenosis)                    [ ] diabetic                    [ ] previous CABG                    [ ] Renal Failure     Right Kyle Test: positive     Adjusted Cath Bleeding Risk: 0.6%    Pre-hydration: 250cc NS bolus x 1 for pre cath hydration     EK23:      (07 Sep 2023 13:10)    Allergies    streptomycin (Unknown)    Intolerances      Patient seen and examined at bedside.   Patient without complaints.  Denies CP, SOB, palpitations, or dizziness  No events on telemetry     Vital Signs Last 24 Hrs  T(C): --  T(F): --  HR: 63  BP: 122/81  BP(mean): --  RR: 18  SpO2: 100% room air         MEDICATIONS  (STANDING):    MEDICATIONS  (PRN):      REVIEW OF SYSTEMS:          All negative except as mentioned in HPI    PHYSICAL EXAM:           CONSTITUTIONAL: Well-developed; well-nourished; in no acute distress  	SKIN: warm, dry  	HEAD: Normocephalic; atraumatic  	EYES: PERRL.  	ENT: No nasal discharge, airway clear, mucous membranes moist  	NECK: Supple; non tender.  	CARD: +S1, +S2, no murmurs, gallops, or rubs. Regular rate and rhythm    	RESP: No wheezes, rales or rhonchi. CTA B/L  	ABD: soft ntnd, + BS x 4 quadrants  	EXT: moves all extremities,  no clubbing, cyanosis or edema  	NEURO: Alert and oriented x3, no focal deficits          PSYCH: Cooperative, appropriate          VASCULAR:  + Rad / + PTs / +  DPs          EXTREMITY:     	   Right Radial: Dressing D/C/I, DSTAT in place, access site soft, no hematoma, no pain, + pulses, no sign of infection, no numbness            ECG: pending at 1330  labs pending at 1330                                                                                                                 LABS:                        16.4   8.41  )-----------( 253      ( 11 Sep 2023 06:45 )             49.2         141  |  102  |  13  ----------------------------<  115<H>  4.6   |  23  |  1.0    Ca    9.3      11 Sep 2023 06:45        A/P:  I discussed the case with Cardiologist Dr. Medel   and recommend the following:    S/P PCI:   Intervention:  Successful PCI of ramus and distal LAD with balloon angioplasty s/p JOIE x 2    Implants: Synergy XD 2.25 mm x 20 mm  to ramus, Synergy XD 3.00 x 12 mm to distal LAD                  AUC: 7     FINDINGS:   Coronary Dominance: right     LM: minimal irregularities    LAD:   prox: mild disease  mid: moderate disease 60% stenosis   distal: severe disease with 90% stenosis s/p successful PCI     CX:   prox: moderate disease with 50% stenosis   distal: mild disease    Ramus: severe disease proximally with 90% stenosis s/p successful PCI     RCA: prox: moderate disease with 50% stenosis   patent stents in mid and distal RCA      LVEDP: 13 mmHg                       CBC/ECG at 1330                   NS  _200cc/hr x _6 hr  	     Continue DAPT ( Aspirin 81 mg PO Daily and Brilinta 90mg PO twice daily ), ARB,  B-Blocker, Statin Therapy, ezetimibe, PPI                          Patient pharmacy called in for Brilinta prescription and it is  $0.00 per month, avail for pickup- and patient previously on medication prior                   Patient agreeing to take DAPT for at least one year or as directed by cardiologist                    Pt given instructions on importance of taking antiplatelet medication or risk acute stent thrombosis/death                   Post cath instructions, access site care and activity restrictions reviewed with patient                     Discussed with patient to return to hospital if experience chest pain, shortness breath, dizziness and site bleeding                   Aggressive risk factor modification, diet counseling, smoking cessation discussed with patient                       Can discharge patient from cardiac standpoint at 1530  after ambulating without symptoms and access site wnl, ECG and blood work reviewed                    Benefis of Cardiac Rehab discussed with patient, All documents sent to [facility]. Patient instructed to call and make first                               appointment after first f/u visit with Cardiologist                    Follow up with Cardiology Dr. Medel   in  two weeks.  Instructed to call and make an appointment                      Discharge instructions as follows, when ready to d/c:                   - Continue medical regimen as prescribed to prevent chest pain                   - Continue dual anti-platelet therapy, beta blocker, statin, arb, PPI, ezetimibe                   - If you are diabetic and taking medication containing Metformin, do not take them for 48 hours after the procedure                   - Instructed to call 911 if chest pain, shortness of breath or bleeding from access site                   - No heavy lifting >10lbs x 1 week                   - No driving x 24 hours                   - No baths, swimming pools x 1 week, may shower                   - Low sodium low fat low cholesterol diet                   - Follow-up with Cardiologist in 1-2 weeks after discharge

## 2023-09-11 NOTE — ASU PATIENT PROFILE, ADULT - FALL HARM RISK - UNIVERSAL INTERVENTIONS
Bed in lowest position, wheels locked, appropriate side rails in place/Call bell, personal items and telephone in reach/Instruct patient to call for assistance before getting out of bed or chair/Non-slip footwear when patient is out of bed/[place] to call system/Physically safe environment - no spills, clutter or unnecessary equipment/Purposeful Proactive Rounding/Room/bathroom lighting operational, light cord in reach

## 2023-09-14 DIAGNOSIS — I25.84 CORONARY ATHEROSCLEROSIS DUE TO CALCIFIED CORONARY LESION: ICD-10-CM

## 2023-09-14 DIAGNOSIS — Z95.5 PRESENCE OF CORONARY ANGIOPLASTY IMPLANT AND GRAFT: ICD-10-CM

## 2023-09-14 DIAGNOSIS — I25.10 ATHEROSCLEROTIC HEART DISEASE OF NATIVE CORONARY ARTERY WITHOUT ANGINA PECTORIS: ICD-10-CM

## 2023-10-03 ENCOUNTER — APPOINTMENT (OUTPATIENT)
Dept: CARDIOLOGY | Facility: CLINIC | Age: 63
End: 2023-10-03
Payer: MEDICAID

## 2023-10-03 VITALS
BODY MASS INDEX: 30.64 KG/M2 | HEIGHT: 70 IN | TEMPERATURE: 97.2 F | RESPIRATION RATE: 16 BRPM | DIASTOLIC BLOOD PRESSURE: 70 MMHG | WEIGHT: 214 LBS | OXYGEN SATURATION: 99 % | HEART RATE: 64 BPM | SYSTOLIC BLOOD PRESSURE: 130 MMHG

## 2023-10-03 DIAGNOSIS — I22.2 SUBSEQUENT NON-ST ELEVATION (NSTEMI) MYOCARDIAL INFARCTION: ICD-10-CM

## 2023-10-03 DIAGNOSIS — E66.3 OVERWEIGHT: ICD-10-CM

## 2023-10-03 PROBLEM — I21.4 NON-ST ELEVATION (NSTEMI) MYOCARDIAL INFARCTION: Chronic | Status: ACTIVE | Noted: 2023-09-07

## 2023-10-03 PROCEDURE — 99214 OFFICE O/P EST MOD 30 MIN: CPT | Mod: 25

## 2023-10-03 PROCEDURE — 93000 ELECTROCARDIOGRAM COMPLETE: CPT

## 2023-10-04 RX ORDER — EZETIMIBE 10 MG/1
10 TABLET ORAL
Qty: 90 | Refills: 3 | Status: ACTIVE | COMMUNITY
Start: 2023-10-04 | End: 1900-01-01

## 2023-10-05 RX ORDER — EZETIMIBE 10 MG/1
10 TABLET ORAL
Qty: 90 | Refills: 2 | Status: ACTIVE | COMMUNITY
Start: 2022-01-11 | End: 1900-01-01

## 2023-12-22 ENCOUNTER — RX RENEWAL (OUTPATIENT)
Age: 63
End: 2023-12-22

## 2024-01-09 ENCOUNTER — RX RENEWAL (OUTPATIENT)
Age: 64
End: 2024-01-09

## 2024-01-09 RX ORDER — FOLIC ACID 1 MG/1
1 TABLET ORAL
Qty: 90 | Refills: 3 | Status: ACTIVE | COMMUNITY
Start: 2022-04-27 | End: 1900-01-01

## 2024-01-23 ENCOUNTER — APPOINTMENT (OUTPATIENT)
Dept: CARDIOLOGY | Facility: CLINIC | Age: 64
End: 2024-01-23
Payer: MEDICAID

## 2024-01-23 PROCEDURE — 93320 DOPPLER ECHO COMPLETE: CPT

## 2024-01-23 PROCEDURE — 93325 DOPPLER ECHO COLOR FLOW MAPG: CPT

## 2024-01-23 PROCEDURE — 93351 STRESS TTE COMPLETE: CPT

## 2024-02-23 ENCOUNTER — APPOINTMENT (OUTPATIENT)
Dept: ORTHOPEDIC SURGERY | Facility: CLINIC | Age: 64
End: 2024-02-23
Payer: MEDICAID

## 2024-02-23 DIAGNOSIS — M76.62 ACHILLES TENDINITIS, LEFT LEG: ICD-10-CM

## 2024-02-23 DIAGNOSIS — M51.9 UNSPECIFIED THORACIC, THORACOLUMBAR AND LUMBOSACRAL INTERVERTEBRAL DISC DISORDER: ICD-10-CM

## 2024-02-23 DIAGNOSIS — M94.262 CHONDROMALACIA, LEFT KNEE: ICD-10-CM

## 2024-02-23 DIAGNOSIS — M65.4 RADIAL STYLOID TENOSYNOVITIS [DE QUERVAIN]: ICD-10-CM

## 2024-02-23 DIAGNOSIS — M94.261 CHONDROMALACIA, RIGHT KNEE: ICD-10-CM

## 2024-02-23 PROCEDURE — 99213 OFFICE O/P EST LOW 20 MIN: CPT

## 2024-02-24 PROBLEM — M94.262 CHONDROMALACIA OF LEFT KNEE: Status: ACTIVE | Noted: 2023-04-19

## 2024-02-24 PROBLEM — M76.62 ACHILLES TENDINITIS OF LEFT LOWER EXTREMITY: Status: ACTIVE | Noted: 2023-04-19

## 2024-02-24 PROBLEM — M94.261 CHONDROMALACIA OF RIGHT KNEE: Status: ACTIVE | Noted: 2023-04-19

## 2024-02-24 PROBLEM — M51.9 LUMBAR DISC DISEASE: Status: ACTIVE | Noted: 2023-04-19

## 2024-02-24 PROBLEM — M65.4 TENOSYNOVITIS, DE QUERVAIN: Status: ACTIVE | Noted: 2024-02-24

## 2024-02-24 NOTE — HISTORY OF PRESENT ILLNESS
[de-identified] :  62 still active has put on weight pain at rest 7 activity 8 does limp heart attack at 36 on medications for his heart still not allowed to take NSAID does not smoke no drug allergies\par  History of Present Illness\par  after last visit was doing well and was stable with the therapy but then it stopped as resulting started having some\par  increased pain in his left hip and restarted the diclofenac but then after 6 days had shortness of breath more recently\par  in April he developed some pain in the right shoulder this shoulder had had a cortisone injection July 5, 2017 and felt\par  much better he was told he had some calcific deposit the time no MRI was done weight stable he is tries to do his\par  walking. No other trauma last visit cortisone injection helped as did the resumption of PT 11 visits now walking again

## 2024-02-24 NOTE — IMAGING
[de-identified] :  pleasant he is easy to examine in no distress neck and back some spasm both shoulders good motion no impingement  Both hips good motion diffuse lower back tenderness tight dorsal lumbar fascia and hamstrings negative straight leg bilaterally  Both knees mild swelling and crepitus medially calf soft negative Homans sign   Left heel tenderness around the Achilles insertion Left wrist tenderness over the radial styloid positive Finkelstein  X-ray left calcaneus unremarkable small spur  X-rays both knees mild degenerative change

## 2024-02-24 NOTE — REASON FOR VISIT
[FreeTextEntry2] : Patient is here for several complaints. He complains of his hands L>R , neck, back, shoulder, hips & shoulders bothering him. 63  Since last visit went to the hospital twice with 2 stents each time now is on Brilinta cardiac stable had a STEMI in July using Voltaren gel and a topical cream and Tylenol pain is 6/7 out of 10 weight is 212 pounds no trauma since last exam only allergy streptomycin does not smoke

## 2024-02-24 NOTE — ASSESSMENT
[FreeTextEntry1] : No orthopedic intervention clearly is significant cardiac history with me has put him back on NSAID even without Brilinta once a trial therapy his weight is good Voltaren gel is fine I will see him back in a few months

## 2024-04-08 ENCOUNTER — RX RENEWAL (OUTPATIENT)
Age: 64
End: 2024-04-08

## 2024-04-08 RX ORDER — METOPROLOL SUCCINATE 50 MG/1
50 TABLET, EXTENDED RELEASE ORAL DAILY
Qty: 90 | Refills: 3 | Status: ACTIVE | COMMUNITY
Start: 2021-09-09 | End: 1900-01-01

## 2024-04-08 RX ORDER — METOPROLOL SUCCINATE 25 MG/1
25 TABLET, EXTENDED RELEASE ORAL
Qty: 90 | Refills: 3 | Status: ACTIVE | COMMUNITY
Start: 2021-09-09 | End: 1900-01-01

## 2024-04-08 RX ORDER — LOSARTAN POTASSIUM 50 MG/1
50 TABLET, FILM COATED ORAL
Qty: 90 | Refills: 3 | Status: ACTIVE | COMMUNITY
Start: 2021-09-09 | End: 1900-01-01

## 2024-05-21 ENCOUNTER — APPOINTMENT (OUTPATIENT)
Dept: CARDIOLOGY | Facility: CLINIC | Age: 64
End: 2024-05-21
Payer: MEDICAID

## 2024-05-21 VITALS
RESPIRATION RATE: 16 BRPM | HEART RATE: 64 BPM | WEIGHT: 220 LBS | DIASTOLIC BLOOD PRESSURE: 70 MMHG | HEIGHT: 70 IN | TEMPERATURE: 96.6 F | BODY MASS INDEX: 31.5 KG/M2 | SYSTOLIC BLOOD PRESSURE: 125 MMHG | OXYGEN SATURATION: 98 %

## 2024-05-21 DIAGNOSIS — E78.2 MIXED HYPERLIPIDEMIA: ICD-10-CM

## 2024-05-21 DIAGNOSIS — I25.10 ATHEROSCLEROTIC HEART DISEASE OF NATIVE CORONARY ARTERY W/OUT ANGINA PECTORIS: ICD-10-CM

## 2024-05-21 PROCEDURE — 99214 OFFICE O/P EST MOD 30 MIN: CPT | Mod: 25

## 2024-05-21 PROCEDURE — 93000 ELECTROCARDIOGRAM COMPLETE: CPT

## 2024-05-21 RX ORDER — ATORVASTATIN CALCIUM 20 MG/1
20 TABLET, FILM COATED ORAL
Qty: 90 | Refills: 3 | Status: ACTIVE | COMMUNITY
Start: 1900-01-01 | End: 1900-01-01

## 2024-05-21 NOTE — ASSESSMENT
[FreeTextEntry1] : # Coronary arteriosclerosis in native artery  (I25.10): # Mixed hypercholesterolemia and hypertriglyceridemia  (E78.2): # Overweight  (E66.9):  62 yo male with pmhx and presentation as above cad/cp stable BP/dyslipidemia Arthritis/obesity  Cont present care stress echo low risk, cont with med rx for cd Weight reduction strongly encouraged, daily exercises and low caloric intake discussed Cont with aggressive risk modif Diet and act as above Increase CoQ 10, ample hydration decrease Lipitor to 20 in the view of myalgia repeat labs RTC 4 months

## 2024-05-21 NOTE — HISTORY OF PRESENT ILLNESS
[FreeTextEntry1] : 64 yo male with pmhx as below is here for af/up visit S/p IPMI 1998, successfully trombolized, cath at [place] showed mod 2v cad, has been medically mx since then Last stress MPI 2/14 - low risk, stress echo 4/17 and 1/19 - no ischemia s/p ccta - extensive mod cad with possible severe lesion on rca 7/23 admitted to [facility] with ami, s/p cath/ pci of rca with abdulkadir residual lad and ri ds, s/p staged PCI 9/23 gained few lbs no c/o sob, nagel, pnd, orthopnea, no peripheral edema. No palpitations or exertional symptoms, no syncope or dizziness. et is stable

## 2024-06-12 ENCOUNTER — RX RENEWAL (OUTPATIENT)
Age: 64
End: 2024-06-12

## 2024-06-12 RX ORDER — NITROGLYCERIN 0.4 MG/1
0.4 TABLET SUBLINGUAL
Qty: 100 | Refills: 1 | Status: ACTIVE | COMMUNITY
Start: 2023-10-04 | End: 1900-01-01

## 2024-08-14 DIAGNOSIS — D64.9 ANEMIA, UNSPECIFIED: ICD-10-CM

## 2024-09-16 ENCOUNTER — NON-APPOINTMENT (OUTPATIENT)
Age: 64
End: 2024-09-16

## 2024-09-17 ENCOUNTER — APPOINTMENT (OUTPATIENT)
Dept: CARDIOLOGY | Facility: CLINIC | Age: 64
End: 2024-09-17
Payer: MEDICAID

## 2024-09-17 VITALS
BODY MASS INDEX: 31.5 KG/M2 | DIASTOLIC BLOOD PRESSURE: 62 MMHG | SYSTOLIC BLOOD PRESSURE: 112 MMHG | OXYGEN SATURATION: 96 % | HEART RATE: 60 BPM | RESPIRATION RATE: 16 BRPM | WEIGHT: 220 LBS | TEMPERATURE: 97.2 F | HEIGHT: 70 IN

## 2024-09-17 DIAGNOSIS — E78.2 MIXED HYPERLIPIDEMIA: ICD-10-CM

## 2024-09-17 DIAGNOSIS — R07.9 CHEST PAIN, UNSPECIFIED: ICD-10-CM

## 2024-09-17 DIAGNOSIS — I25.10 ATHEROSCLEROTIC HEART DISEASE OF NATIVE CORONARY ARTERY W/OUT ANGINA PECTORIS: ICD-10-CM

## 2024-09-17 PROCEDURE — 93000 ELECTROCARDIOGRAM COMPLETE: CPT

## 2024-09-17 PROCEDURE — 99214 OFFICE O/P EST MOD 30 MIN: CPT | Mod: 25

## 2024-09-17 NOTE — ASSESSMENT
[FreeTextEntry1] : # Coronary arteriosclerosis in native artery  (I25.10): # Mixed hypercholesterolemia and hypertriglyceridemia  (E78.2): # Overweight  (E66.9):  62 yo male with pmhx and presentation as above cad/cp stable BP/dyslipidemia Arthritis/obesity  Cont present care stress echo low risk, cont with med rx for cd Weight reduction strongly encouraged, daily exercises and low caloric intake discussed Cont with aggressive risk modif Diet and act as above Increase CoQ 10, ample hydration cont Lipitor 20 in the view of myalgia change brilinta 60 bid repeat labs reviewed and d/w the pt RTC 4 months

## 2024-09-17 NOTE — HISTORY OF PRESENT ILLNESS
[FreeTextEntry1] : 64 yo male with pmhx as below is here for af/up visit S/p IPMI 1998, successfully trombolized, cath at [place] showed mod 2v cad, has been medically mx since then Last stress MPI 2/14 - low risk, stress echo 4/17 and 1/19 - no ischemia s/p ccta - extensive mod cad with possible severe lesion on rca 7/23 admitted to [facility] with ami, s/p cath/ pci of rca with abdulkadir residual lad and ri ds, s/p staged PCI 9/23 no c/o sob, nagel, pnd, orthopnea, no peripheral edema. No palpitations or exertional symptoms, no syncope or dizziness. et is stable

## 2024-10-28 ENCOUNTER — APPOINTMENT (OUTPATIENT)
Dept: ORTHOPEDIC SURGERY | Facility: CLINIC | Age: 64
End: 2024-10-28

## 2024-12-30 ENCOUNTER — RX RENEWAL (OUTPATIENT)
Age: 64
End: 2024-12-30

## 2025-03-04 ENCOUNTER — NON-APPOINTMENT (OUTPATIENT)
Age: 65
End: 2025-03-04

## 2025-03-04 ENCOUNTER — APPOINTMENT (OUTPATIENT)
Dept: CARDIOLOGY | Facility: CLINIC | Age: 65
End: 2025-03-04
Payer: MEDICAID

## 2025-03-04 VITALS
OXYGEN SATURATION: 99 % | DIASTOLIC BLOOD PRESSURE: 70 MMHG | HEART RATE: 62 BPM | TEMPERATURE: 96.8 F | WEIGHT: 218 LBS | HEIGHT: 70 IN | RESPIRATION RATE: 1 BRPM | BODY MASS INDEX: 31.21 KG/M2 | SYSTOLIC BLOOD PRESSURE: 125 MMHG

## 2025-03-04 DIAGNOSIS — E78.2 MIXED HYPERLIPIDEMIA: ICD-10-CM

## 2025-03-04 DIAGNOSIS — R73.02 IMPAIRED GLUCOSE TOLERANCE (ORAL): ICD-10-CM

## 2025-03-04 DIAGNOSIS — E66.3 OVERWEIGHT: ICD-10-CM

## 2025-03-04 DIAGNOSIS — I22.2 SUBSEQUENT NON-ST ELEVATION (NSTEMI) MYOCARDIAL INFARCTION: ICD-10-CM

## 2025-03-04 DIAGNOSIS — I25.10 ATHEROSCLEROTIC HEART DISEASE OF NATIVE CORONARY ARTERY W/OUT ANGINA PECTORIS: ICD-10-CM

## 2025-03-04 PROCEDURE — 93000 ELECTROCARDIOGRAM COMPLETE: CPT

## 2025-03-04 PROCEDURE — 99213 OFFICE O/P EST LOW 20 MIN: CPT | Mod: 25

## 2025-07-15 ENCOUNTER — APPOINTMENT (OUTPATIENT)
Dept: CARDIOLOGY | Facility: CLINIC | Age: 65
End: 2025-07-15
Payer: MEDICAID

## 2025-07-15 VITALS
BODY MASS INDEX: 31.5 KG/M2 | OXYGEN SATURATION: 98 % | WEIGHT: 220 LBS | HEART RATE: 61 BPM | DIASTOLIC BLOOD PRESSURE: 80 MMHG | HEIGHT: 70 IN | RESPIRATION RATE: 16 BRPM | TEMPERATURE: 97.2 F | SYSTOLIC BLOOD PRESSURE: 130 MMHG

## 2025-07-15 PROCEDURE — 99214 OFFICE O/P EST MOD 30 MIN: CPT | Mod: 25

## 2025-07-15 PROCEDURE — 93000 ELECTROCARDIOGRAM COMPLETE: CPT

## 2025-08-27 ENCOUNTER — APPOINTMENT (OUTPATIENT)
Dept: ORTHOPEDIC SURGERY | Facility: CLINIC | Age: 65
End: 2025-08-27

## 2025-08-29 ENCOUNTER — APPOINTMENT (OUTPATIENT)
Dept: ORTHOPEDIC SURGERY | Facility: CLINIC | Age: 65
End: 2025-08-29
Payer: MEDICAID

## 2025-08-29 DIAGNOSIS — M77.31 CALCANEAL SPUR, RIGHT FOOT: ICD-10-CM

## 2025-08-29 DIAGNOSIS — M25.551 PAIN IN RIGHT HIP: ICD-10-CM

## 2025-08-29 DIAGNOSIS — M25.552 PAIN IN RIGHT HIP: ICD-10-CM

## 2025-08-29 DIAGNOSIS — M51.9 UNSPECIFIED THORACIC, THORACOLUMBAR AND LUMBOSACRAL INTERVERTEBRAL DISC DISORDER: ICD-10-CM

## 2025-08-29 PROCEDURE — 73630 X-RAY EXAM OF FOOT: CPT | Mod: RT

## 2025-08-29 PROCEDURE — 72110 X-RAY EXAM L-2 SPINE 4/>VWS: CPT

## 2025-08-29 PROCEDURE — 73522 X-RAY EXAM HIPS BI 3-4 VIEWS: CPT

## 2025-08-29 PROCEDURE — 99213 OFFICE O/P EST LOW 20 MIN: CPT

## 2025-08-29 RX ORDER — TIZANIDINE 4 MG/1
4 TABLET ORAL
Qty: 30 | Refills: 0 | Status: ACTIVE | COMMUNITY
Start: 2025-08-29 | End: 1900-01-01

## 2025-08-29 RX ORDER — METHYLPREDNISOLONE 4 MG/1
4 TABLET ORAL
Qty: 1 | Refills: 1 | Status: ACTIVE | COMMUNITY
Start: 2025-08-29 | End: 1900-01-01

## 2025-09-13 ENCOUNTER — APPOINTMENT (OUTPATIENT)
Dept: MRI IMAGING | Facility: CLINIC | Age: 65
End: 2025-09-13
Payer: MEDICAID

## 2025-09-13 PROCEDURE — 72148 MRI LUMBAR SPINE W/O DYE: CPT

## 2025-09-16 ENCOUNTER — APPOINTMENT (OUTPATIENT)
Dept: CARDIOLOGY | Facility: CLINIC | Age: 65
End: 2025-09-16
Payer: MEDICAID

## 2025-09-16 ENCOUNTER — NON-APPOINTMENT (OUTPATIENT)
Age: 65
End: 2025-09-16

## 2025-09-16 VITALS
HEART RATE: 64 BPM | DIASTOLIC BLOOD PRESSURE: 80 MMHG | HEIGHT: 70 IN | WEIGHT: 222 LBS | BODY MASS INDEX: 31.78 KG/M2 | SYSTOLIC BLOOD PRESSURE: 140 MMHG | TEMPERATURE: 97.2 F

## 2025-09-16 DIAGNOSIS — I25.10 ATHEROSCLEROTIC HEART DISEASE OF NATIVE CORONARY ARTERY W/OUT ANGINA PECTORIS: ICD-10-CM

## 2025-09-16 DIAGNOSIS — R73.02 IMPAIRED GLUCOSE TOLERANCE (ORAL): ICD-10-CM

## 2025-09-16 DIAGNOSIS — E78.2 MIXED HYPERLIPIDEMIA: ICD-10-CM

## 2025-09-16 PROCEDURE — 93000 ELECTROCARDIOGRAM COMPLETE: CPT

## 2025-09-16 PROCEDURE — 99213 OFFICE O/P EST LOW 20 MIN: CPT | Mod: 25
